# Patient Record
Sex: FEMALE | Race: WHITE | NOT HISPANIC OR LATINO | Employment: UNEMPLOYED | URBAN - METROPOLITAN AREA
[De-identification: names, ages, dates, MRNs, and addresses within clinical notes are randomized per-mention and may not be internally consistent; named-entity substitution may affect disease eponyms.]

---

## 2017-11-04 ENCOUNTER — OFFICE VISIT - HEALTHEAST (OUTPATIENT)
Dept: FAMILY MEDICINE | Facility: CLINIC | Age: 22
End: 2017-11-04

## 2017-11-04 DIAGNOSIS — R05.9 COUGH: ICD-10-CM

## 2017-11-04 DIAGNOSIS — J45.901 EXACERBATION OF PERSISTENT ASTHMA: ICD-10-CM

## 2017-11-04 RX ORDER — ALBUTEROL SULFATE 0.83 MG/ML
2.5 SOLUTION RESPIRATORY (INHALATION) EVERY 4 HOURS PRN
Qty: 75 ML | Refills: 0 | Status: SHIPPED | OUTPATIENT
Start: 2017-11-04 | End: 2022-08-26

## 2019-08-08 ENCOUNTER — OFFICE VISIT - HEALTHEAST (OUTPATIENT)
Dept: FAMILY MEDICINE | Facility: CLINIC | Age: 24
End: 2019-08-08

## 2019-08-08 DIAGNOSIS — G47.9 SLEEP DISORDER: ICD-10-CM

## 2019-08-08 DIAGNOSIS — K90.0 CELIAC DISEASE: ICD-10-CM

## 2019-08-08 DIAGNOSIS — F41.8 DEPRESSION WITH ANXIETY: ICD-10-CM

## 2020-01-15 ENCOUNTER — COMMUNICATION - HEALTHEAST (OUTPATIENT)
Dept: FAMILY MEDICINE | Facility: CLINIC | Age: 25
End: 2020-01-15

## 2020-01-15 DIAGNOSIS — L70.9 ACNE, UNSPECIFIED ACNE TYPE: ICD-10-CM

## 2020-01-17 RX ORDER — BENZOYL PEROXIDE 2.5 G/100G
GEL TOPICAL
Qty: 42.5 G | Refills: 3 | Status: SHIPPED | OUTPATIENT
Start: 2020-01-17 | End: 2022-08-26

## 2020-01-21 ENCOUNTER — COMMUNICATION - HEALTHEAST (OUTPATIENT)
Dept: FAMILY MEDICINE | Facility: CLINIC | Age: 25
End: 2020-01-21

## 2020-01-21 DIAGNOSIS — J45.20 MILD INTERMITTENT ASTHMA, UNSPECIFIED WHETHER COMPLICATED: ICD-10-CM

## 2020-01-21 RX ORDER — ALBUTEROL SULFATE 90 UG/1
2 AEROSOL, METERED RESPIRATORY (INHALATION) EVERY 4 HOURS PRN
Qty: 1 INHALER | Refills: 0 | Status: SHIPPED | OUTPATIENT
Start: 2020-01-21

## 2020-03-15 ENCOUNTER — COMMUNICATION - HEALTHEAST (OUTPATIENT)
Dept: FAMILY MEDICINE | Facility: CLINIC | Age: 25
End: 2020-03-15

## 2020-03-15 DIAGNOSIS — F41.8 DEPRESSION WITH ANXIETY: ICD-10-CM

## 2020-03-15 DIAGNOSIS — G47.9 SLEEP DISORDER: ICD-10-CM

## 2020-05-04 ENCOUNTER — COMMUNICATION - HEALTHEAST (OUTPATIENT)
Dept: FAMILY MEDICINE | Facility: CLINIC | Age: 25
End: 2020-05-04

## 2020-06-08 ENCOUNTER — COMMUNICATION - HEALTHEAST (OUTPATIENT)
Dept: FAMILY MEDICINE | Facility: CLINIC | Age: 25
End: 2020-06-08

## 2020-06-08 DIAGNOSIS — G47.9 SLEEP DISORDER: ICD-10-CM

## 2020-07-02 ENCOUNTER — RECORDS - HEALTHEAST (OUTPATIENT)
Dept: ADMINISTRATIVE | Facility: OTHER | Age: 25
End: 2020-07-02

## 2020-07-02 LAB
CHLAMYDIA_EXT- HISTORICAL: NEGATIVE
SPECIMEN DESCRIPTION_EXT (HISTORICAL CONVERSION): NORMAL

## 2020-08-21 ENCOUNTER — COMMUNICATION - HEALTHEAST (OUTPATIENT)
Dept: FAMILY MEDICINE | Facility: CLINIC | Age: 25
End: 2020-08-21

## 2020-08-26 ENCOUNTER — OFFICE VISIT - HEALTHEAST (OUTPATIENT)
Dept: FAMILY MEDICINE | Facility: CLINIC | Age: 25
End: 2020-08-26

## 2020-08-26 DIAGNOSIS — L70.9 ACNE, UNSPECIFIED ACNE TYPE: ICD-10-CM

## 2020-08-26 DIAGNOSIS — G47.9 SLEEP DISORDER: ICD-10-CM

## 2020-08-26 DIAGNOSIS — F41.8 DEPRESSION WITH ANXIETY: ICD-10-CM

## 2020-08-26 RX ORDER — ADAPALENE GEL USP, 0.3% 3 MG/G
GEL TOPICAL
Qty: 45 G | Refills: 3 | Status: SHIPPED | OUTPATIENT
Start: 2020-08-26 | End: 2021-12-08

## 2020-08-26 ASSESSMENT — PATIENT HEALTH QUESTIONNAIRE - PHQ9: SUM OF ALL RESPONSES TO PHQ QUESTIONS 1-9: 17

## 2020-08-26 ASSESSMENT — MIFFLIN-ST. JEOR: SCORE: 1450.11

## 2020-09-22 ENCOUNTER — COMMUNICATION - HEALTHEAST (OUTPATIENT)
Dept: FAMILY MEDICINE | Facility: CLINIC | Age: 25
End: 2020-09-22

## 2020-09-22 ENCOUNTER — COMMUNICATION - HEALTHEAST (OUTPATIENT)
Dept: SCHEDULING | Facility: CLINIC | Age: 25
End: 2020-09-22

## 2020-09-22 DIAGNOSIS — F41.8 DEPRESSION WITH ANXIETY: ICD-10-CM

## 2020-10-18 ENCOUNTER — COMMUNICATION - HEALTHEAST (OUTPATIENT)
Dept: FAMILY MEDICINE | Facility: CLINIC | Age: 25
End: 2020-10-18

## 2020-10-19 ENCOUNTER — COMMUNICATION - HEALTHEAST (OUTPATIENT)
Dept: FAMILY MEDICINE | Facility: CLINIC | Age: 25
End: 2020-10-19

## 2020-10-29 ENCOUNTER — COMMUNICATION - HEALTHEAST (OUTPATIENT)
Dept: FAMILY MEDICINE | Facility: CLINIC | Age: 25
End: 2020-10-29

## 2020-10-29 ENCOUNTER — OFFICE VISIT - HEALTHEAST (OUTPATIENT)
Dept: FAMILY MEDICINE | Facility: CLINIC | Age: 25
End: 2020-10-29

## 2020-10-29 DIAGNOSIS — R10.10 UPPER ABDOMINAL PAIN: ICD-10-CM

## 2020-11-03 ENCOUNTER — COMMUNICATION - HEALTHEAST (OUTPATIENT)
Dept: FAMILY MEDICINE | Facility: CLINIC | Age: 25
End: 2020-11-03

## 2020-11-03 DIAGNOSIS — R10.10 UPPER ABDOMINAL PAIN: ICD-10-CM

## 2020-11-19 ENCOUNTER — TRANSFERRED RECORDS (OUTPATIENT)
Dept: MULTI SPECIALTY CLINIC | Facility: CLINIC | Age: 25
End: 2020-11-19

## 2020-11-19 LAB — PAP SMEAR - HIM PATIENT REPORTED: NEGATIVE

## 2020-11-20 ENCOUNTER — VIRTUAL VISIT (OUTPATIENT)
Dept: FAMILY MEDICINE | Facility: OTHER | Age: 25
End: 2020-11-20

## 2020-11-20 ENCOUNTER — RECORDS - HEALTHEAST (OUTPATIENT)
Dept: HEALTH INFORMATION MANAGEMENT | Facility: CLINIC | Age: 25
End: 2020-11-20

## 2020-11-20 NOTE — PROGRESS NOTES
"Date: 2020 16:19:07  Clinician: Guilherme Nichols  Clinician NPI: [id number]  Patient: Genevieve Ryan  Patient : 1995  Patient Address: [address] [place] [address]  Patient Phone: [phone]  Visit Protocol: URI  Patient Summary:  Genevieve is a 25 year old ( : 1995 ) female who initiated a [facility] Visit for COVID-19 (Coronavirus) evaluation and screening. When asked the question \"Please sign me up to receive news, health information and promotions from [facility].\", Genevieve responded \"No\".    Genevieve states her symptoms started today.   Her symptoms consist of rhinitis, myalgia, chills, malaise, a sore throat, a headache, and a cough. Genevieve also feels feverish but was unable to measure her temperature.   Symptom details     Nasal secretions: The color of her mucus is clear.    Cough: Genevieve coughs a few times an hour and her cough is more bothersome at night. Phlegm comes into her throat when she coughs. She believes her cough is caused by post-nasal drip. The color of the phlegm is white and clear.     Sore throat: Genevieve reports having moderate throat pain (4-6 on a 10 point pain scale), does not have exudate on her tonsils, and can swallow liquids. She is not sure if the lymph nodes in her neck are enlarged. A rash has not appeared on the skin since the sore throat started.     Headache: She states the headache is moderate (4-6 on a 10 point pain scale).      Genevieve denies having vomiting, facial pain or pressure, teeth pain, ageusia, diarrhea, ear pain, wheezing, nasal congestion, nausea, and anosmia. She also denies taking antibiotic medication in the past month, having recent facial or sinus surgery in the past 60 days, and having a sinus infection within the past year. She is not experiencing dyspnea.   Precipitating events  Within the past week, Genevieve has not been exposed to someone with strep throat. She has not recently been exposed to someone with influenza. " Genevieve has been in close contact with the following high risk individuals: adults 65 or older and people with asthma, heart disease or diabetes.   Pertinent COVID-19 (Coronavirus) information  Genevieve does not work or volunteer as healthcare worker or a . In the past 14 days, Genevieve has not worked or volunteered at a healthcare facility or group living setting.   In the past 14 days, she also has not lived in a congregate living setting.   Genevieve has had a close contact with a laboratory-confirmed COVID-19 patient within 14 days of symptom onset. She was not exposed at her work. Date Genevieve was exposed to the laboratory-confirmed COVID-19 patient: 11/15/2020   Additional information about contact with COVID-19 (Coronavirus) patient as reported by the patient (free text): My mother works in an ER. I live with her. She is confirmed covid positive.    Since December 2019, Genevieve has been tested for COVID-19 and has had upper respiratory infection (URI) or influenza-like illness.      Result of COVID-19 test: Negative     Date of her COVID-19 test: 08/01/2020     Date(s) of previous URI or influenza-like illness (free-text): Early august     Symptoms Genevieve experienced during previous URI or influenza-like illness as reported by the patient (free-text): Headache, fever, sore throat        Pertinent medical history  Genevieve does not get yeast infections when she takes antibiotics.   Genevieve does not need a return to work/school note.   Weight: 160 lbs   Genevieve does not smoke or use smokeless tobacco.   She denies pregnancy and denies breastfeeding. She has menstruated in the past month.   Additional information as reported by the patient (free text): I have celiac disease and athsma   Weight: 160 lbs    MEDICATIONS: CLAUDY (28) oral, Prozac oral, trazodone oral, ALLERGIES: Singulair, Augmentin  Clinician Response:  Dear Genevieve,   Your symptoms show that you may have coronavirus (COVID-19).  "This illness can cause fever, cough and trouble breathing. Many people get a mild case and get better on their own. Some people can get very sick.  What should I do?  We would like to test you for this virus.   1. Please call [phone] to schedule your visit. Explain that you were referred by [facility] to have a COVID-19 test. Be ready to [facility].  * If you need to schedule in [facility] please call [phone] or for [employer] employees please call [phone].  * If you need to schedule in the [place] area please call [phone]. [employer] employees call [phone].  The following will serve as your written order for this COVID Test, ordered by me, for the indication of suspected COVID [Z20.828]: The test will be ordered in nfon, our electronic health record, after you are scheduled. It will show as ordered and authorized by Rodolfo Gasca MD.  Order: COVID-19 (Coronavirus) PCR for SYMPTOMATIC testing from [facility].   2. When it's time for your COVID test:  Stay at least 6 feet away from others. (If someone will drive you to your test, stay in the backseat, as far away from the  as you can.)   Cover your mouth and nose with a mask, tissue or washcloth.  Go straight to the testing site. Don't make any stops on the way there or back.      3.Starting now: Stay home and away from others (self-isolate) until:   You've had no fever---and no medicine that reduces fever---for one full day (24 hours). And...   Your other symptoms have gotten better. For example, your cough or breathing has improved. And...   At least 10 days have passed since your symptoms started.       During this time, don't leave the house except for testing or medical care.   Stay in your own room, even for meals. Use your own bathroom if you can.   Stay away from others in your home. No hugging, kissing or shaking hands. No visitors.  Don't go to work, school or anywhere else.    Clean \"high touch\" surfaces often " (doorknobs, counters, handles, etc.). Use a household cleaning spray or wipes. You'll find a full list of  on the EPA website: www.epa.gov/pesticide-registration/list-n-disinfectants-use-against-sars-cov-2.   Cover your mouth and nose with a mask, tissue or washcloth to avoid spreading germs.  Wash your hands and face often. Use soap and water.  Caregivers in these groups are at risk for severe illness due to COVID-19:  o People 65 years and older  o People who live in a nursing home or long-term care facility  o People with chronic disease (lung, heart, cancer, diabetes, kidney, liver, immunologic)  o People who have a weakened immune system, including those who:   Are in cancer treatment  Take medicine that weakens the immune system, such as corticosteroids  Had a bone marrow or organ transplant  Have an immune deficiency  Have poorly controlled HIV or AIDS  Are obese (body mass index of 40 or higher)  Smoke regularly   o Caregivers should wear gloves while washing dishes, handling laundry and cleaning bedrooms and bathrooms.  o Use caution when washing and drying laundry: Don't shake dirty laundry, and use the warmest water setting that you can.  o For more tips, go to www.cdc.gov/coronavirus/2019-ncov/downloads/10Things.pdf.    How can I take care of myself?    Get lots of rest. Drink extra fluids (unless a doctor has told you not to).   Take Tylenol (acetaminophen) for fever or pain. If you have liver or kidney problems, ask your family doctor if it's okay to take Tylenol.   Adults can take either:    650 mg (two 325 mg pills) every 4 to 6 hours, or...   1,000 mg (two 500 mg pills) every 8 hours as needed.    Note: Don't take more than 3,000 mg in one day. Acetaminophen is found in many medicines (both prescribed and over-the-counter medicines). Read all labels to be sure you don't take too much.   For children, check the Tylenol bottle for the right dose. The dose is based on the child's age or weight.     If you have other health problems (like cancer, heart failure, an organ transplant or severe kidney disease): Call your specialty clinic if you don't feel better in the next 2 days.       Know when to call 911. Emergency warning signs include:    Trouble breathing or shortness of breath Pain or pressure in the chest that doesn't go away Feeling confused like you haven't felt before, or not being able to wake up Bluish-colored lips or face.  Where can I get more information?   [facility] -- About COVID-19: [email or web address]   CDC -- What to Do If You're Sick: www.cdc.gov/coronavirus/2019-ncov/about/steps-when-sick.html   CDC -- Ending Home Isolation: www.cdc.gov/coronavirus/2019-ncov/hcp/disposition-in-home-patients.html   [facility] -- Caring for Someone: www.cdc.gov/coronavirus/2019-ncov/if-you-are-sick/care-for-someone.html   [facility] -- Interim Guidance for Hospital Discharge to Home: www.[facility].[place]./diseases/coronavirus/hcp/hospdischarge.pdf   [facility] clinical trials (COVID-19 research studies): clinicalaffairs.[facility]-clinical-trials    Below are the COVID-19 hotlines at the [facility] ([facility]). Interpreters are available.    For health questions: Call 959-859-7177 or 1-647.467.1912 (7 a.m. to 7 p.m.) For questions about schools and childcare: Call 530-859-9664 or 1-671.176.6334 (7 a.m. to 7 p.m.)    Diagnosis: Contact with and (suspected) exposure to other viral communicable diseases  Diagnosis ICD: Z20.828

## 2021-03-01 ENCOUNTER — COMMUNICATION - HEALTHEAST (OUTPATIENT)
Dept: FAMILY MEDICINE | Facility: CLINIC | Age: 26
End: 2021-03-01

## 2021-03-01 DIAGNOSIS — L70.9 ACNE, UNSPECIFIED ACNE TYPE: ICD-10-CM

## 2021-03-01 RX ORDER — CLINDAMYCIN PHOSPHATE 11.9 MG/ML
1 SOLUTION TOPICAL 2 TIMES DAILY
Qty: 30 ML | Refills: 3 | Status: SHIPPED | OUTPATIENT
Start: 2021-03-01 | End: 2022-08-26

## 2021-03-04 ENCOUNTER — COMMUNICATION - HEALTHEAST (OUTPATIENT)
Dept: INTERNAL MEDICINE | Facility: CLINIC | Age: 26
End: 2021-03-04

## 2021-03-04 DIAGNOSIS — G47.9 SLEEP DISORDER: ICD-10-CM

## 2021-03-05 RX ORDER — TRAZODONE HYDROCHLORIDE 50 MG/1
50 TABLET, FILM COATED ORAL AT BEDTIME
Qty: 90 TABLET | Refills: 1 | Status: SHIPPED | OUTPATIENT
Start: 2021-03-05

## 2021-03-24 ENCOUNTER — COMMUNICATION - HEALTHEAST (OUTPATIENT)
Dept: FAMILY MEDICINE | Facility: CLINIC | Age: 26
End: 2021-03-24

## 2021-03-24 DIAGNOSIS — F41.8 DEPRESSION WITH ANXIETY: ICD-10-CM

## 2021-03-25 ENCOUNTER — COMMUNICATION - HEALTHEAST (OUTPATIENT)
Dept: FAMILY MEDICINE | Facility: CLINIC | Age: 26
End: 2021-03-25

## 2021-03-25 DIAGNOSIS — F41.8 DEPRESSION WITH ANXIETY: ICD-10-CM

## 2021-03-31 ENCOUNTER — OFFICE VISIT - HEALTHEAST (OUTPATIENT)
Dept: FAMILY MEDICINE | Facility: CLINIC | Age: 26
End: 2021-03-31

## 2021-03-31 DIAGNOSIS — F41.8 DEPRESSION WITH ANXIETY: ICD-10-CM

## 2021-03-31 RX ORDER — FLUOXETINE 40 MG/1
40 CAPSULE ORAL DAILY
Qty: 90 CAPSULE | Refills: 3 | Status: SHIPPED | OUTPATIENT
Start: 2021-03-31 | End: 2022-06-08

## 2021-03-31 ASSESSMENT — ANXIETY QUESTIONNAIRES
3. WORRYING TOO MUCH ABOUT DIFFERENT THINGS: NOT AT ALL
4. TROUBLE RELAXING: NOT AT ALL
6. BECOMING EASILY ANNOYED OR IRRITABLE: NOT AT ALL
5. BEING SO RESTLESS THAT IT IS HARD TO SIT STILL: NOT AT ALL
7. FEELING AFRAID AS IF SOMETHING AWFUL MIGHT HAPPEN: NOT AT ALL
2. NOT BEING ABLE TO STOP OR CONTROL WORRYING: NOT AT ALL
1. FEELING NERVOUS, ANXIOUS, OR ON EDGE: NOT AT ALL
GAD7 TOTAL SCORE: 0

## 2021-03-31 ASSESSMENT — PATIENT HEALTH QUESTIONNAIRE - PHQ9: SUM OF ALL RESPONSES TO PHQ QUESTIONS 1-9: 0

## 2021-05-17 ENCOUNTER — OFFICE VISIT - HEALTHEAST (OUTPATIENT)
Dept: FAMILY MEDICINE | Facility: CLINIC | Age: 26
End: 2021-05-17

## 2021-05-17 DIAGNOSIS — Z00.00 ANNUAL PHYSICAL EXAM: ICD-10-CM

## 2021-05-17 DIAGNOSIS — J98.2 PNEUMOMEDIASTINUM (H): ICD-10-CM

## 2021-05-17 DIAGNOSIS — Z13.1 SCREENING FOR DIABETES MELLITUS: ICD-10-CM

## 2021-05-17 DIAGNOSIS — F33.42 RECURRENT MAJOR DEPRESSIVE DISORDER, IN FULL REMISSION (H): ICD-10-CM

## 2021-05-17 DIAGNOSIS — F51.5 NIGHTMARES: ICD-10-CM

## 2021-05-17 DIAGNOSIS — Z13.220 SCREENING CHOLESTEROL LEVEL: ICD-10-CM

## 2021-05-17 DIAGNOSIS — Z11.59 SCREENING FOR VIRAL DISEASE: ICD-10-CM

## 2021-05-17 RX ORDER — PRAZOSIN HYDROCHLORIDE 1 MG/1
2 CAPSULE ORAL AT BEDTIME
Qty: 60 CAPSULE | Refills: 1 | Status: SHIPPED | OUTPATIENT
Start: 2021-05-17 | End: 2022-08-26

## 2021-05-17 ASSESSMENT — ANXIETY QUESTIONNAIRES
7. FEELING AFRAID AS IF SOMETHING AWFUL MIGHT HAPPEN: NOT AT ALL
6. BECOMING EASILY ANNOYED OR IRRITABLE: NOT AT ALL
5. BEING SO RESTLESS THAT IT IS HARD TO SIT STILL: NOT AT ALL
4. TROUBLE RELAXING: NOT AT ALL
IF YOU CHECKED OFF ANY PROBLEMS ON THIS QUESTIONNAIRE, HOW DIFFICULT HAVE THESE PROBLEMS MADE IT FOR YOU TO DO YOUR WORK, TAKE CARE OF THINGS AT HOME, OR GET ALONG WITH OTHER PEOPLE: NOT DIFFICULT AT ALL
1. FEELING NERVOUS, ANXIOUS, OR ON EDGE: SEVERAL DAYS
GAD7 TOTAL SCORE: 1
2. NOT BEING ABLE TO STOP OR CONTROL WORRYING: NOT AT ALL
3. WORRYING TOO MUCH ABOUT DIFFERENT THINGS: NOT AT ALL

## 2021-05-17 ASSESSMENT — MIFFLIN-ST. JEOR: SCORE: 1430.15

## 2021-05-17 ASSESSMENT — PATIENT HEALTH QUESTIONNAIRE - PHQ9: SUM OF ALL RESPONSES TO PHQ QUESTIONS 1-9: 6

## 2021-05-27 VITALS
DIASTOLIC BLOOD PRESSURE: 80 MMHG | HEIGHT: 63 IN | WEIGHT: 157.4 LBS | HEART RATE: 74 BPM | BODY MASS INDEX: 27.89 KG/M2 | SYSTOLIC BLOOD PRESSURE: 122 MMHG | OXYGEN SATURATION: 97 %

## 2021-05-27 ASSESSMENT — PATIENT HEALTH QUESTIONNAIRE - PHQ9
SUM OF ALL RESPONSES TO PHQ QUESTIONS 1-9: 17
SUM OF ALL RESPONSES TO PHQ QUESTIONS 1-9: 0
SUM OF ALL RESPONSES TO PHQ QUESTIONS 1-9: 6

## 2021-05-28 ASSESSMENT — ASTHMA QUESTIONNAIRES
ACT_TOTALSCORE: 25
ACT_TOTALSCORE: 25

## 2021-05-28 ASSESSMENT — ANXIETY QUESTIONNAIRES
GAD7 TOTAL SCORE: 0
GAD7 TOTAL SCORE: 1

## 2021-05-30 ENCOUNTER — RECORDS - HEALTHEAST (OUTPATIENT)
Dept: ADMINISTRATIVE | Facility: CLINIC | Age: 26
End: 2021-05-30

## 2021-05-31 VITALS — BODY MASS INDEX: 26.89 KG/M2 | WEIGHT: 149.4 LBS

## 2021-05-31 NOTE — PROGRESS NOTES
Assessment/Plan:        1. Depression with anxiety  Doing well on the current plan    Refill:  - FLUoxetine (PROZAC) 20 MG capsule; Take 1 capsule (20 mg total) by mouth daily.  Dispense: 90 capsule; Refill: 1    2. Sleep disorder  Doing well  Continue with  - traZODone (DESYREL) 50 MG tablet; Take 1 tablet (50 mg total) by mouth at bedtime.  Dispense: 90 tablet; Refill: 1    3. Celiac disease    At the conclusion of the encounter the plan of care, disposition and all questions were answered and reviewed, and the patient acknowledged understanding and was involved in the decision making regarding the overall care plan.         Subjective:    Patient ID:   Genevieve Ryan is a 24 y.o. female with a history of depression/anxiety and celiac disease here to establish care, needing a refill on her antidepressants.  She reports to be doing well on her current management with Prozac and trazodone having no issues or concerns or intolerance.  She has felt much better since going gluten-free diet.  She is also wondering if she could however acne medications refilled by her primary care office.  She has no other concerns.      Review of Systems  Allergy: reviewed  A complete 7 point review of systems was obtained and is negative other than what is stated in the HPI.         The following patient's history were reviewed and updated as appropriate:   She  has no past medical history on file.  She  has a past surgical history that includes pr removal adenoids,primary,<13 y/o and pr nasal/maxill sinus endoscopy,dx.  Her family history is not on file.  She  reports that she has never smoked. She has never used smokeless tobacco. Her alcohol and drug histories are not on file..      Outpatient Encounter Medications as of 8/8/2019   Medication Sig Dispense Refill     adapalene (DIFFERIN) 0.3 % gel Apply topically.       benzoyl peroxide 2.5 % topical gel Apply topically.       clindamycin (CLEOCIN T) 1 % external solution Apply  1 application topically.       FLUoxetine (PROZAC) 20 MG capsule   1     traZODone (DESYREL) 50 MG tablet Take 0.5 tablets (25 mg total) by mouth bedtime. 15 tablet 6     [DISCONTINUED] traZODone (DESYREL) 50 MG tablet Take 50 mg by mouth.       albuterol (PROVENTIL HFA;VENTOLIN HFA) 90 mcg/actuation inhaler Inhale 2 puffs every 4 (four) hours as needed.        albuterol (PROVENTIL) 2.5 mg /3 mL (0.083 %) nebulizer solution Take 3 mL (2.5 mg total) by nebulization every 4 (four) hours as needed. 75 mL 0     DIPHENHYDRAMINE HCL ORAL Take by mouth.       drospirenone-ethinyl estradiol (VESTURA, 28,) 3-0.02 mg per tablet TAKE 1 TABLET BY MOUTH EVERY DAY 28 tablet 12     fexofenadine (ALLEGRA) 180 MG tablet Take 180 mg by mouth daily. As needed for allergies       ipratropium (ATROVENT) 0.02 % nebulizer solution Take 500 mcg by nebulization as needed.        ipratropium-albuterol (DUONEB) 0.5-2.5 mg/mL nebulizer 1 vial in nebulizer every 5-8 hours as needed       LACTOBACILLUS RHAMNOSUS GG (CULTURELLE ORAL) Take daily as directed       melatonin 5 mg Tab Take 1 tablet by mouth bedtime.       Facility-Administered Encounter Medications as of 8/8/2019   Medication Dose Route Frequency Provider Last Rate Last Dose     cefTRIAXone injection 1 g (ROCEPHIN)  1 g Intramuscular Once Nithya Egan MD             Objective:   /70 (Patient Site: Right Arm, Patient Position: Sitting, Cuff Size: Adult Regular)   Pulse 81   Wt 154 lb 6.4 oz (70 kg)   SpO2 98%       Physical Exam  General Appearance:    Alert, well hydrated, no distress   Throat:   mucous membranes moist, pharynx normal without lesions   Neck:   Supple, symmetrical, trachea midline, no adenopathy;     thyroid:  no enlargement/tenderness/nodules;    Lungs:     clear to auscultation, no wheezes, rales or rhonchi, symmetric air entry     Heart:    Regular rate and rhythm, S1 and S2 normal, no murmur, rub   or gallop, no edema    Skin:   Skin  color, texture, turgor normal, no rashes or lesions

## 2021-06-03 VITALS — WEIGHT: 154.4 LBS | BODY MASS INDEX: 27.79 KG/M2

## 2021-06-04 VITALS
OXYGEN SATURATION: 99 % | HEIGHT: 63 IN | WEIGHT: 161.8 LBS | HEART RATE: 78 BPM | DIASTOLIC BLOOD PRESSURE: 76 MMHG | BODY MASS INDEX: 28.67 KG/M2 | SYSTOLIC BLOOD PRESSURE: 122 MMHG

## 2021-06-05 VITALS
DIASTOLIC BLOOD PRESSURE: 76 MMHG | WEIGHT: 163 LBS | BODY MASS INDEX: 29.34 KG/M2 | TEMPERATURE: 98.1 F | SYSTOLIC BLOOD PRESSURE: 108 MMHG | HEART RATE: 77 BPM | OXYGEN SATURATION: 98 %

## 2021-06-05 NOTE — TELEPHONE ENCOUNTER
Last Office Visit  8/8/2019 Jovi Ochoa MD  Notes:  1. Depression with anxiety  Doing well on the current plan     Refill:  - FLUoxetine (PROZAC) 20 MG capsule; Take 1 capsule (20 mg total) by mouth daily.  Dispense: 90 capsule; Refill: 1     2. Sleep disorder  Doing well  Continue with  - traZODone (DESYREL) 50 MG tablet; Take 1 tablet (50 mg total) by mouth at bedtime.  Dispense: 90 tablet; Refill: 1     3. Celiac disease     At the conclusion of the encounter the plan of care, disposition and all questions were answered and reviewed, and the patient acknowledged understanding and was involved in the decision making regarding the overall care plan.              Last Filled:  albuterol (PROVENTIL HFA;VENTOLIN HFA) 90 mcg/actuation inhaler     --   Sig - Route: Inhale 2 puffs every 4 (four) hours as needed.  - Inhalation   Class: Historical Med       Next OV:  Visit date not found        Medication teed up for provider signature

## 2021-06-05 NOTE — TELEPHONE ENCOUNTER
Medication Request  Medication name:     1)Clindamycin 1% Topical Solution, 60 ml, apply to affected area every morning  2) Benzoyl Peroxide 2.5% Gel, 60 gm, apply to affected area every morning      Requested Pharmacy: [facility] in [place], [place]  Reason for request: Current medication  When did you use medication last?:  unknown   Patient offered appointment:  N/A - electronic request  Okay to leave a detailed message: no

## 2021-06-05 NOTE — TELEPHONE ENCOUNTER
Called to pt and pt states that she is still using medication for outbreaks - only uses few times per month  - still receiving good results.

## 2021-06-05 NOTE — TELEPHONE ENCOUNTER
Last Office Visit  8/8/2019 Jovi Ochoa MD  Notes:    Genevieve Ryan is a 24 y.o. female with a history of depression/anxiety and celiac disease here to establish care, needing a refill on her antidepressants.  She reports to be doing well on her current management with Prozac and trazodone having no issues or concerns or intolerance.  She has felt much better since going gluten-free diet.  She is also wondering if she could however acne medications refilled by her primary care office.  She has no other concerns.     Last Filled:    clindamycin (CLEOCIN T) 1 % external solution     --   Sig - Route: Apply 1 application topically. - Topical   Class: Historical Med     benzoyl peroxide 2.5 % topical gel     --   Sig - Route: Apply topically. - Topical   Class: Historical Med        Next OV:  Visit date not found -   Return in about 6 months (around 2/8/2020) for Follow up in Primary Care          Medication teed up for provider signature

## 2021-06-06 NOTE — TELEPHONE ENCOUNTER
Last Office Visit  8/8/2019 Jovi Ochoa MD  Notes:  1. Depression with anxiety  Doing well on the current plan     Refill:  - FLUoxetine (PROZAC) 20 MG capsule; Take 1 capsule (20 mg total) by mouth daily.  Dispense: 90 capsule; Refill: 1     2. Sleep disorder  Doing well  Continue with  - traZODone (DESYREL) 50 MG tablet; Take 1 tablet (50 mg total) by mouth at bedtime.  Dispense: 90 tablet; Refill: 1       Last Filled:    traZODone (DESYREL) 50 MG tablet  90 tablet  1  8/8/2019   No    Sig - Route: Take 1 tablet (50 mg total) by mouth at bedtime. - Oral    Sent to pharmacy as: traZODone (DESYREL) 50 MG tablet    E-Prescribing Status: Receipt confirmed by pharmacy (8/8/2019  9:35 AM CDT)          Next OV:  Visit date not found -nothing scheduled      Medication teed up for provider signature

## 2021-06-08 NOTE — TELEPHONE ENCOUNTER
Refill Request: Trazadone  Last filled: 3.17.2020 disp 90 no refills   last visit: 8.8.2019  Sleep disorder  Doing well  Continue with  - traZODone (DESYREL) 50 MG tablet; Take 1 tablet (50 mg total) by mouth at bedtime.  Dispense: 90 tablet; Refill: 1

## 2021-06-10 NOTE — PROGRESS NOTES
Assessment/Plan:        1. Sleep disorder  Refill  - traZODone (DESYREL) 50 MG tablet; Take 1 tablet (50 mg total) by mouth at bedtime.  Dispense: 90 tablet; Refill: 1    2. Depression with anxiety  Discussed change of dosage to 40 mg    - FLUoxetine (PROZAC) 40 MG capsule; Take 1 capsule (40 mg total) by mouth daily.  Dispense: 30 capsule; Refill: 0  Follow-up in a month to recheck      3. Acne, unspecified acne type  Refill  - adapalene (DIFFERIN) 0.3 % gel; Apply to the affected area daily  Dispense: 45 g; Refill: 3        At the conclusion of the encounter the plan of care, disposition and all questions were answered and reviewed, and the patient acknowledged understanding and was involved in the decision making regarding the overall care plan.     Patient Care Team:  Jovi Ochoa MD as PCP - General (Family Medicine)  Jovi Ochoa MD as Assigned PCP          Subjective:    Patient ID:   Genevieve Ryan is a 25 y.o. female here for the follow-up of the followin. Depression with anxiety  On fluoxetine 20 mg daily   seems to be worsening over the past 2 weeks not being able to cope and according with of the higher dose of the medication can be more helpful       2. Sleep disorder  Has been doing well on trazodone for the most part but states increased anxiety recently has not been able to and has had a couple of nightmares in the past 2 weeks.  Thinking that his anxiety is under better control than will also help to improve her sleep situation.    3- facial acne  Has used Differin gel in the past but not used for several years;  would like to get a refill to start using  She is not actively trying to conceive or planning to get pregnant.    Review of Systems  Allergy: reviewed  General : negative  A complete 5 point review of systems was obtained and is negative other than what is stated in the HPI.       The following patient's history were reviewed and updated as appropriate:    "She  has no past medical history on file..      Outpatient Encounter Medications as of 8/26/2020   Medication Sig Dispense Refill     albuterol (PROAIR HFA;PROVENTIL HFA;VENTOLIN HFA) 90 mcg/actuation inhaler Inhale 2 puffs every 4 (four) hours as needed. 1 Inhaler 0     benzoyl peroxide 2.5 % topical gel Apply to the skin two times a day 42.5 g 3     clindamycin (CLEOCIN T) 1 % external solution Apply 1 application topically 2 (two) times a day. 30 mL 3     drospirenone-ethinyl estradiol (VESTURA, 28,) 3-0.02 mg per tablet TAKE 1 TABLET BY MOUTH EVERY DAY 28 tablet 12     FLUoxetine (PROZAC) 20 MG capsule Take 1 capsule (20 mg total) by mouth daily. 90 capsule 0     melatonin 5 mg Tab Take 1 tablet by mouth bedtime.       traZODone (DESYREL) 50 MG tablet Take 1 tablet (50 mg total) by mouth at bedtime. 90 tablet 0     adapalene (DIFFERIN) 0.3 % gel Apply topically.       albuterol (PROVENTIL) 2.5 mg /3 mL (0.083 %) nebulizer solution Take 3 mL (2.5 mg total) by nebulization every 4 (four) hours as needed. 75 mL 0     DIPHENHYDRAMINE HCL ORAL Take by mouth.       fexofenadine (ALLEGRA) 180 MG tablet Take 180 mg by mouth daily. As needed for allergies       ipratropium (ATROVENT) 0.02 % nebulizer solution Take 500 mcg by nebulization as needed.        ipratropium-albuterol (DUONEB) 0.5-2.5 mg/mL nebulizer 1 vial in nebulizer every 5-8 hours as needed       LACTOBACILLUS RHAMNOSUS GG (CULTURELLE ORAL) Take daily as directed       Facility-Administered Encounter Medications as of 8/26/2020   Medication Dose Route Frequency Provider Last Rate Last Dose     cefTRIAXone injection 1 g (ROCEPHIN)  1 g Intramuscular Once Nithya Egan MD             Objective:   /76 (Patient Site: Right Arm, Patient Position: Sitting, Cuff Size: Adult Regular)   Pulse 78   Ht 5' 2.5\" (1.588 m)   Wt 161 lb 12.8 oz (73.4 kg)   LMP 08/01/2020   SpO2 99%   BMI 29.12 kg/m        Physical Exam  General Appearance:    " Alert, cooperative, no distress,    Throat:   Lips, mucosa, and tongue normal; teeth and gums normal   Neck:   Supple, symmetrical, trachea midline, no adenopathy;     thyroid:  no enlargement/tenderness/nodules;    Lungs:     Clear to auscultation bilaterally, respirations unlabored    Heart:    Regular rate and rhythm, S1 and S2 normal, no murmur, rub   or gallop   Skin:   Skin color, texture, turgor normal, no rashes or lesions   Psych:     Mental status: Alert, oriented, thought content appropriate, affect: normal, thought content exhibits logical connections

## 2021-06-11 NOTE — TELEPHONE ENCOUNTER
Last Office Visit  9/22/2020 Joshua Kelly, DO    [facility]      08/26/2020 for med check    Notes:  2. Depression with anxiety  Discussed change of dosage to 40 mg     - FLUoxetine (PROZAC) 40 MG capsule; Take 1 capsule (40 mg total) by mouth daily.  Dispense: 30 capsule; Refill: 0         Last Filled:  08/26/2020     FLUoxetine (PROZAC) 40 MG capsule  30 capsule  0  8/26/2020   No    Sig - Route: Take 1 capsule (40 mg total) by mouth daily. - Oral        Next OV:  Visit date not found - pt states that the current dose of 40mg is working great for her        Medication teed up for provider signature

## 2021-06-12 NOTE — PROGRESS NOTES
"Genevieve Ryan is a 25 y.o. female who is being evaluated via a billable video visit.      The patient has been notified of following:     \"This video visit will be conducted via a call between you and your physician/provider. We have found that certain health care needs can be provided without the need for an in-person physical exam.  This service lets us provide the care you need with a video conversation.  If a prescription is necessary we can send it directly to your pharmacy.  If lab work is needed we can place an order for that and you can then stop by our lab to have the test done at a later time.    Video visits are billed at different rates depending on your insurance coverage. Please reach out to your insurance provider with any questions.    If during the course of the call the physician/provider feels a video visit is not appropriate, you will not be charged for this service.\"    Patient has given verbal consent to a Video visit? Yes  How would you like to obtain your AVS? AVS Preference: MyChart.  If dropped by the video visit, the video invitation should be sent to: Text to cell phone: [phone]  Will anyone else be joining your video visit? No        Video Start Time: 10:10 AM    Assessment/Plan:        1. Upper abdominal pain  GERD w/ esophagitis and hemorrhagic  Post ER follow-up    Plan:   advised trial of prilosec up to 80 mg daily with referral to upper GI endoscopy    - Ambulatory Referral for Upper GI Endoscopy   We will follow-up to the results of the study and manage accordingly.      She is advised to call back for follow-up closely with any worsening symptoms or change in disposition evaluation and management.          At the conclusion of the encounter the plan of care, disposition and all questions were answered and reviewed, and the patient acknowledged understanding and was involved in the decision making regarding the overall care plan.           Subjective:    Patient ID: "   Genevieve Ryan is a 25 y.o. female video getting in ER follow-up seen on 10/19/2020 for upper abdominal pain, diagnosed with GERD and hemorrhage and hemorrhage given omeprazole 20 mg take twice daily with a follow-up in primary care to discuss follow-up outpatient upper endoscopy.  She reports to having ongoing upper abdominal pain radiating up to her chest without coffee-ground emesis.      Review of Systems  Allergy: reviewed  General : negative  A complete 5 point review of systems was obtained and is negative other than what is stated in the HPI.       The following patient's history were reviewed and updated as appropriate:   She  has no past medical history on file..      Outpatient Encounter Medications as of 10/29/2020   Medication Sig Dispense Refill     adapalene (DIFFERIN) 0.3 % gel Apply to the affected area daily 45 g 3     benzoyl peroxide 2.5 % topical gel Apply to the skin two times a day 42.5 g 3     clindamycin (CLEOCIN T) 1 % external solution Apply 1 application topically 2 (two) times a day. 30 mL 3     drospirenone-ethinyl estradiol (VESTURA, 28,) 3-0.02 mg per tablet TAKE 1 TABLET BY MOUTH EVERY DAY 28 tablet 12     FLUoxetine (PROZAC) 40 MG capsule Take 1 capsule (40 mg total) by mouth daily. 90 capsule 1     melatonin 5 mg Tab Take 1 tablet by mouth bedtime.       omeprazole (PRILOSEC) 20 MG capsule Take 2 capsules (40 mg total) by mouth daily before breakfast. 30 capsule 0     prochlorperazine (COMPAZINE) 10 MG tablet Take 0.5 tablets (5 mg total) by mouth every 6 (six) hours as needed for nausea. 10 tablet 0     traZODone (DESYREL) 50 MG tablet Take 1 tablet (50 mg total) by mouth at bedtime. 90 tablet 1     albuterol (PROAIR HFA;PROVENTIL HFA;VENTOLIN HFA) 90 mcg/actuation inhaler Inhale 2 puffs every 4 (four) hours as needed. 1 Inhaler 0     albuterol (PROVENTIL) 2.5 mg /3 mL (0.083 %) nebulizer solution Take 3 mL (2.5 mg total) by nebulization every 4 (four) hours as needed. 75  mL 0     ipratropium (ATROVENT) 0.02 % nebulizer solution Take 500 mcg by nebulization as needed.        ipratropium-albuterol (DUONEB) 0.5-2.5 mg/mL nebulizer 1 vial in nebulizer every 5-8 hours as needed       Facility-Administered Encounter Medications as of 10/29/2020   Medication Dose Route Frequency Provider Last Rate Last Dose     cefTRIAXone injection 1 g (ROCEPHIN)  1 g Intramuscular Once Nithya Egan MD             Objective:   There were no vitals taken for this visit.      Physical Exam  GENERAL: Healthy, alert and no distress  NEURO: Cranial nerves grossly intact. Mentation and speech appropriate for age.  PSYCH: Mentation appears normal, affect normal/bright, judgement and insight intact, normal speech and appearance well-groomed         Video-Visit Details    Type of service:  Video Visit    Video End Time (time video stopped): 10:20 AM  Originating Location (pt. Location): Home    Distant Location (provider location):  [facility]     Platform used for Video Visit: Billy Ochoa MD

## 2021-06-12 NOTE — TELEPHONE ENCOUNTER
pls call patient with the following comment:   Given the recent normal studies ( EGD, ER labs/evaluations)  I would recommend a consultation by GI.    See epic for referral order

## 2021-06-13 NOTE — PROGRESS NOTES
Subjective:   Genevieve Ryan is a 22 y.o. female  No question data found.  Chief Complaint   Patient presents with     Cough     5 day antibiotic. On day 4. Everything else feels better except the cough. Nebbing was helping but ran out of sollution.   Says she was seen at [facility] on 10/31 and was told she tested negative for strep. She was not coughing, but had a sore throat. On  she went to [facility] and saw her primary. She was given a Z romie because of throat looked like strep, had a fever and swollen glands. Today is the 4th day of antibiotics. Yesterday she started coughing. Her mother is concerned about Mono. She has used her albuterol nebulized, but has not gotten relief for her coughing. Her albuterol solution just . In the last 48 hours has not had a fever. Admits slight CP from persistent coughing, but denies SOB.  Admits that she has not been sleeping well because of her coughing.  Says breathing feels tight. Last used albuterol at 10 am. Says in  she had a pneumomediastinum. Was diagnosed with Celiac about 14 months ago. Requesting prednisone and albuterol solution.  Review of Systems  Const - Resp - see HPI  Allergies   Allergen Reactions     Amoxicillin-Pot Clavulanate      Augmentin      Singulair [Montelukast]      Hives      Ondansetron Rash       Current Outpatient Prescriptions:      adapalene (DIFFERIN) 0.3 % gel, Apply topically., Disp: , Rfl:      albuterol (PROVENTIL) 2.5 mg /3 mL (0.083 %) nebulizer solution, 2.5 mg every 4 (four) hours as needed. , Disp: , Rfl:      azithromycin (ZITHROMAX) 500 MG tablet, Take 500 mg by mouth daily., Disp: , Rfl:      fexofenadine (ALLEGRA) 180 MG tablet, Take 180 mg by mouth daily. As needed for allergies, Disp: , Rfl:      ipratropium-albuterol (DUONEB) 0.5-2.5 mg/mL nebulizer, 1 vial in nebulizer every 5-8 hours as needed, Disp: , Rfl:      LACTOBACILLUS RHAMNOSUS GG (CULTURELLE ORAL), Take daily as directed, Disp:  , Rfl:      melatonin 5 mg Tab, Take 1 tablet by mouth bedtime., Disp: , Rfl:      traZODone (DESYREL) 50 MG tablet, Take 0.5 tablets (25 mg total) by mouth bedtime., Disp: 15 tablet, Rfl: 6     albuterol (PROVENTIL HFA;VENTOLIN HFA) 90 mcg/actuation inhaler, Inhale 2 puffs every 4 (four) hours as needed. , Disp: , Rfl:      benzoyl peroxide 2.5 % topical gel, Apply topically., Disp: , Rfl:      clindamycin (CLEOCIN T) 1 % external solution, Apply 1 application topically., Disp: , Rfl:      DIPHENHYDRAMINE HCL ORAL, Take by mouth., Disp: , Rfl:      drospirenone-ethinyl estradiol (VESTURA, 28,) 3-0.02 mg per tablet, TAKE 1 TABLET BY MOUTH EVERY DAY, Disp: 28 tablet, Rfl: 12     ipratropium (ATROVENT) 0.02 % nebulizer solution, Take 500 mcg by nebulization as needed. , Disp: , Rfl:     Current Facility-Administered Medications:      cefTRIAXone injection 1 g (ROCEPHIN), 1 g, Intramuscular, Once, Ntihya Arias MD  Patient Active Problem List   Diagnosis     Chronic Sinusitis     Foot Pain (Soft Tissue)     Headache     Dizziness     Allergies     Moderate Persistent Asthma     Constipation     Cellulitis     Medical History Reviewed  Objective:     Vitals:    11/04/17 1427   BP: 128/74   Patient Site: Right Arm   Patient Position: Sitting   Cuff Size: Adult Regular   Pulse: 85   Resp: 16   Temp: 98.4  F (36.9  C)   TempSrc: Oral   SpO2: 99%   Weight: 149 lb 6.4 oz (67.8 kg)   Gen - Pt in NAD  Eyes - Conjunctiva non injected, no drainage  Face - not TTP over frontal sinus areas; not TTP over maxillary area  Ears - external canals - no induration, Right TM - not injected, Left TM - not injected   Nose - not congested, no nasal drainage  Pharynx - non injected, tonsils 1+ size  Neck - supple, no cervical adenopathy, no masses  Cor - RRR w/o murmur  Lungs - Good air entry; no wheezes or crackles noted on auscultation - persistent tight dry coughing noted  Skin - no lesions, no rashes noted    Assessment  Name band; - Plan   Medical Decision Making -22-year-old woman with a history of moderate persistent asthma states she was treated for pharyngitis on  with a Z-Cornelius.  States that her sore throat resolved but she started coughing yesterday.  Says she has been coughing persistently since then but denies any shortness of breath.  On exam patient has good air movement but a persistent tight cough.  Discussed with patient and mother that she has already been treated for strep, pneumonia and pertussis, even though pertussis is was not likely from her presentation.  Agreed with patient that there is an indication for oral steroids, and since patient's albuterol solution was , ordered 1 carton of 25 vials.     1. Exacerbation of persistent asthma  - albuterol (PROVENTIL) 2.5 mg /3 mL (0.083 %) nebulizer solution; Take 3 mL (2.5 mg total) by nebulization every 4 (four) hours as needed.  Dispense: 75 mL; Refill: 0  - predniSONE (DELTASONE) 20 MG tablet; Take 2 tablets (40 mg total) by mouth daily for 5 days.  Dispense: 10 tablet; Refill: 0    2. Cough  - codeine-guaiFENesin (GUAIFENESIN AC)  mg/5 mL liquid; Take 5-10 mL by mouth every 6 (six) hours as needed.  Dispense: 180 mL; Refill: 0    At the conclusion of the encounter, assessment and plan were discussed.   All questions were answered.   The patient or guardian acknowledged understanding and was involved in the decision making regarding the overall care plan.    Patient Instructions   1. Follow up with your primary within a week  2. Stay well hydrated  2. May alternate tylenol every 6 hours with ibuprofen every 6 hours as needed for fever or pain  3. If symptoms are getting worse over time or you have any questions, call the clinic number - it's answered

## 2021-06-16 NOTE — PROGRESS NOTES
Assessment & Plan     Depression with anxiety  Doing well  Continue current plan  - FLUoxetine (PROZAC) 40 MG capsule; Take 1 capsule (40 mg total) by mouth daily.               Return in about 1 year (around 3/31/2022) for Recheck, or sooner with any issues or concerns.    Jovi Ochoa MD  [facility]   Genevieve Ryan is 25 y.o. and presents today for the following health issues   Here for med check and follow-up of depression/anxiety, on Prozac 40 mg dosage taking with no side effects or intolerance. She is requesting a refill and having no other concerns             Objective    /76   Pulse 77   Temp 98.1  F (36.7  C)   Wt 163 lb (73.9 kg)   SpO2 98%   BMI 29.34 kg/m    Body mass index is 29.34 kg/m .  Physical Exam  General Appearance:    Alert, well hydrated, no distress   Lungs:     clear to auscultation, no wheezes, rales or rhonchi, symmetric air entry     Heart:    Regular rate and rhythm, S1 and S2 normal, no murmur, rub   or gallop, no edema    Skin:   Skin color, texture, turgor normal, no rashes or lesions

## 2021-06-17 ENCOUNTER — COMMUNICATION - HEALTHEAST (OUTPATIENT)
Dept: FAMILY MEDICINE | Facility: CLINIC | Age: 26
End: 2021-06-17

## 2021-06-17 ENCOUNTER — COMMUNICATION - HEALTHEAST (OUTPATIENT)
Dept: INTERNAL MEDICINE | Facility: CLINIC | Age: 26
End: 2021-06-17

## 2021-06-17 DIAGNOSIS — L70.9 ACNE, UNSPECIFIED ACNE TYPE: ICD-10-CM

## 2021-06-17 NOTE — PROGRESS NOTES
Assessment:     1. Annual physical exam     2. Pneumomediastinum (H)     3. Recurrent major depressive disorder, in full remission (H)     4. Nightmares  prazosin (MINIPRESS) 1 MG capsule   5. Screening cholesterol level  Lipid [place] FASTING   6. Screening for viral disease  HIV Antigen/Antibody Diagnostic [place]    Hepatitis C Antibody (Anti-HCV)   7. Screening for diabetes mellitus  Glucose     Medical decision making: Patient is a 25-year-old with history of intermittent asthma, history of pneumomediastinum and recurrent major depression currently in remission who presents today for annual physical exam.  She is concerned about sleep due to nightmares.  We had a discussion and we will start with the prazosin.  If no benefit, I would recommend psychotherapy.  Screening labs for health maintenance ordered for her and she will come for a future lab only visit.   Healthy female exam.      Plan:       All questions answered.  Diagnosis explained in detail, including differential.  Discussed healthy lifestyle modifications.  Educational material distributed.  Follow up: Return in about 6 months (around 11/17/2021) for recheck, Sooner if not improving or worsening.     Subjective:     Chief Complaint   Patient presents with     Establish Care     Used to be seen at [facility], PHQ9 and GAD7 done today      Genevieve Ryan is a 25 y.o. female who presents for an annual exam. The patient is not currently sexually active. The patient participates in regular exercise: yes. The patient reports that there is not domestic violence in her life.   Patient is here to establish care.  She has a diagnosis of asthma that she believes precedes her diagnosis of celiac disease.  1 she has had weight out from her diet, she has had minimal symptoms.  She hardly uses her albuterol or Atrovent.  She has had 2 episode of pneumomediastinum.  Once was when she was having active asthma.  The second 1 was in 2018.  This was while  she was drinking alcohol and had vomiting and she was told by her specialist that due to her underlying lung condition, she had a pneumomediastinum.  She is not drinking any alcohol now.  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  She enjoys walking and kayaking with her brother.    Patient has depression that is under remission.  However, she feels her insomnia is persistent.  This is due to nightmares and difficulty falling asleep because of fear of nightmares.  Her nightmares were in remission while she was drinking alcohol.  She denies any trauma in particular.  She informs me that she is always been an anxious child.  Her nightmares usually consist of seeing animals like small cats from big cats, running away etc.    Healthy Habits:   Regular Exercise: Yes  Sunscreen Use: Yes  Healthy Diet: Yes  Dental Visits Regularly: Yes  Seat Belt: Yes  Sexually active: No  Self Breast Exam Monthly:No  Hemoccults: N/A  Flex Sig: N/A  Colonoscopy: N/A  Lipid Profile: No  Glucose Screen: No  Prevention of Osteoporosis: N/A  Last Dexa: N/A  Guns at Home:  No      Immunization History   Administered Date(s) Administered     DTaP, historic 1997, 1997, 01/26/1998, 10/28/1998, 07/22/2002     HPV 9 Valent 05/17/2021     HPV Quadrivalent 08/27/2009, 11/10/2009     Hep A, historic 08/27/2009     Hep B, Adult 06/25/2014     Hep B, historic 1997, 1997, 10/28/1998     Hepatitis A, Ped/Adol 2 Dose IM (18yr & under) 08/27/2009     HiB, historic,unspecified 1997, 1997, 10/28/1998     INFLUENZA,SEASONAL QUAD, PF, =/> 6months 02/29/2016, 01/09/2017, 09/11/2018     IPV 1997, 1997, 01/26/1998, 07/22/2002     Influenza [id number], 11/10/2009     Influenza, Seasonal, Inj PF IIV3 09/22/2010     Influenza, inj, historic,unspecified 10/17/2005, 11/21/2005, 10/18/2008, 09/30/2009, 09/10/2014, 10/27/2019     Influenza, seasonal,quad inj 6-35 mos 10/10/2013     MMR 07/28/1998,  07/22/2002     Meningococcal MCV4P 08/27/2009     Pneumo Polysac 23-V 05/17/2021     Tdap 08/27/2009, 06/25/2014     Varicella 07/28/1998, 08/27/2009     Immunization status: due today.    No exam data present    Gynecologic History  No LMP recorded.  Contraception: OCP (estrogen/progesterone)  Last Pap: 2020. Results were: normal Partner's OB GYN      OB History   No obstetric history on file.       Current Outpatient Medications   Medication Sig Dispense Refill     adapalene (DIFFERIN) 0.3 % gel Apply to the affected area daily 45 g 3     albuterol (PROAIR HFA;PROVENTIL HFA;VENTOLIN HFA) 90 mcg/actuation inhaler Inhale 2 puffs every 4 (four) hours as needed. 1 Inhaler 0     albuterol (PROVENTIL) 2.5 mg /3 mL (0.083 %) nebulizer solution Take 3 mL (2.5 mg total) by nebulization every 4 (four) hours as needed. 75 mL 0     benzoyl peroxide 2.5 % topical gel Apply to the skin two times a day 42.5 g 3     clindamycin (CLEOCIN T) 1 % external solution Apply 1 application topically 2 (two) times a day. 30 mL 3     drospirenone-ethinyl estradiol (VESTURA, 28,) 3-0.02 mg per tablet TAKE 1 TABLET BY MOUTH EVERY DAY 28 tablet 12     FLUoxetine (PROZAC) 40 MG capsule Take 1 capsule (40 mg total) by mouth daily. 90 capsule 3     ipratropium (ATROVENT) 0.02 % nebulizer solution Take 500 mcg by nebulization as needed.        melatonin 5 mg Tab Take 1 tablet by mouth bedtime.       traZODone (DESYREL) 50 MG tablet Take 1 tablet (50 mg total) by mouth at bedtime. 90 tablet 1     prazosin (MINIPRESS) 1 MG capsule Take 2 capsules (2 mg total) by mouth at bedtime. Take 1 capsule for the first week 60 capsule 1     No current facility-administered medications for this visit.      History reviewed. No pertinent past medical history.  Past Surgical History:   Procedure Laterality Date     TX NASAL/MAXILL SINUS ENDOSCOPY,DX      Description: Nasal Endoscopy - Maxillary Sinus;  Recorded: 05/31/2007;     TX REMOVAL ADENOIDS,PRIMARY,<11 Y/O       Description: Adenoidectomy;  Recorded: 05/31/2007;     Amoxicillin-pot clavulanate, Singulair [montelukast], and Ondansetron  Family History   Problem Relation Age of Onset     Hypertension Mother      Alcoholism Father      No Medical Problems Brother      Alzheimer's disease Maternal Grandmother      Stroke Maternal Grandfather         90+     Parkinsonism Paternal Grandmother      No Medical Problems Paternal Grandfather         90+     Social History     Socioeconomic History     Marital status: Single     Spouse name: Not on file     Number of children: Not on file     Years of education: Not on file     Highest education level: Not on file   Occupational History     Not on file   Social Needs     Financial resource strain: Not on file     Food insecurity     Worry: Not on file     Inability: Not on file     Transportation needs     Medical: Not on file     Non-medical: Not on file   Tobacco Use     Smoking status: Never Smoker     Smokeless tobacco: Never Used   Substance and Sexual Activity     Alcohol use: Not Currently     Comment: Quit for 150 days     Drug use: Never     Sexual activity: Not on file   Lifestyle     Physical activity     Days per week: Not on file     Minutes per session: Not on file     Stress: Not on file   Relationships     Social connections     Talks on phone: Not on file     Gets together: Not on file     Attends Buddhism service: Not on file     Active member of club or organization: Not on file     Attends meetings of clubs or organizations: Not on file     Relationship status: Not on file     Intimate partner violence     Fear of current or ex partner: Not on file     Emotionally abused: Not on file     Physically abused: Not on file     Forced sexual activity: Not on file   Other Topics Concern     Not on file   Social History Narrative    Going to Grad.     Adult education/     Single.    Dimitri Rodriguez MD  5/17/2021               Review of Systems  General:   "Denies problem  Eyes: Denies problem  Ears/Nose/Throat: Denies problem  Cardiovascular: Denies problem  Respiratory:  Denies problem  Gastrointestinal:  Denies problem, Genitourinary: Denies problem  Musculoskeletal:  Denies problem  Skin: Denies problem  Neurologic: Denies problem  Psychiatric: Denies problem  Endocrine: Denies problem  Heme/Lymphatic: Denies problem   Allergic/Immunologic: Denies problem        Objective:         Vitals:    05/17/21 1413   BP: 122/80   Pulse: 74   SpO2: 97%   Weight: 157 lb 6.4 oz (71.4 kg)   Height: 5' 2.5\" (1.588 m)     Body mass index is 28.33 kg/m .    Physical Exam:  GENERAL: Healthy, alert and no distress  EYES: Eyes grossly normal to inspection. No discharge or erythema, or obvious scleral/conjunctival abnormalities.  HENT: ear canals and TM's normal  NECK: no adenopathy, thyroid normal to palpation, trachea midline and normal to palpation and no carotid bruits  RESP: lungs clear to auscultation - no rales, rhonchi or wheezes  CV: regular rates and rhythm  NEURO: Cranial nerves grossly intact. Mentation and speech appropriate for age.  PSYCH: Mentation appears normal, affect normal/bright, judgement and insight intact, normal speech and appearance well-groomed       Little interest or pleasure in doing things: Not at all  Feeling down, depressed, or hopeless: Not at all  Trouble falling or staying asleep, or sleeping too much: Nearly every day  Feeling tired or having little energy: Nearly every day  Poor appetite or overeating: Not at all  Feeling bad about yourself - or that you are a failure or have let yourself or your family down: Not at all  Trouble concentrating on things, such as reading the newspaper or watching television: Not at all  Moving or speaking so slowly that other people could have noticed. Or the opposite - being so fidgety or restless that you have been moving around a lot more than usual: Not at all  Thoughts that you would be better off dead, or of " hurting yourself in some way: Not at all  PHQ-9 Total Score: 6  If you checked off any problems, how difficult have these problems made it for you to do your work, take care of things at home, or get along with other people?: Somewhat difficult    How difficult did these problems make it for you to do your work, take care of things at home or get along with other people? : Not difficult at all (2021  3:00 PM)  Feeling nervous, anxious, or on edge: 1 (2021  3:00 PM)  Not being able to stop or control worryin (2021  3:00 PM)  Worrying too much about different things: 0 (2021  3:00 PM)  Trouble relaxin (2021  3:00 PM)  Being so restless that it's hard to sit still: 0 (2021  3:00 PM)  Becoming easily annoyed or irritable: 0 (2021  3:00 PM)  Feeling afraid as if something awful might happen: 0 (2021  3:00 PM)  ONDINA 7 Total Score: 1 (2021  3:00 PM)  How difficult did these problems make it for you to do your work, take care of things at home or get along with other people? : Not difficult at all (2021  3:00 PM)

## 2021-06-18 RX ORDER — ADAPALENE GEL USP, 0.3% 3 MG/G
GEL TOPICAL
Qty: 45 G | Refills: 3 | Status: SHIPPED | OUTPATIENT
Start: 2021-06-18 | End: 2021-11-16

## 2021-06-18 RX ORDER — BENZOYL PEROXIDE 2.5 G/100G
GEL TOPICAL
Qty: 60 G | Refills: 3 | Status: SHIPPED | OUTPATIENT
Start: 2021-06-18 | End: 2021-11-16

## 2021-06-18 NOTE — PATIENT INSTRUCTIONS - HE
Patient Instructions by Dimitri Rodriguez MD at 5/17/2021  2:30 PM     Author: Dimitri Rodriguez MD Service: -- Author Type: Physician    Filed: 5/17/2021  2:54 PM Encounter Date: 5/17/2021 Status: Signed    : Dimitri Rodriguez MD (Physician)       Patient Education     Prevention Guidelines, Women Ages 18 to 39  Screening tests and vaccines are an important part of managing your health. A screening test is done to find possible disorders or diseases in people who don't have any symptoms. The goal is to find a disease early so lifestyle changes can be made and you can be watched more closely to reduce the risk of disease, or to detect it early enough to treat it most effectively. Screening tests are not considered diagnostic, but are used to determine if more testing is needed. Health counseling is essential, too. Below are guidelines for these, for women ages 18 to 39. Talk with your healthcare provider to make sure youre up-to-date on what you need.  Screening Who needs it How often   Alcohol misuse All women in this age group At routine exams   Blood pressure All women in this age group Yearly checkup if your blood pressure is normal  Normal blood pressure is less than 120/80 mm Hg  If your blood pressure reading is higher than normal, follow the advice of your healthcare provider   Breast cancer All women in this age group should talk with their healthcare providers about the need for clinical breast exams (CBE)1 Clinical breast exam every 3 years1   Cervical cancer Women ages 21 and older Women between ages 21 and 29 should have a Pap test every 3 years; women between ages 30 and 65 are advised to have a Pap test plus an HPV test every 5 years   Chlamydia Sexually active women ages 25 and younger, and women at increased risk for infection (such as having multiple sex partners) Every year if you're at risk or have symptoms   Depression All women in this age group At routine exams   Type 2 diabetes,  prediabetes All women with no symptoms who are overweight or obese and have 1 or more other risk factors for diabetes At least every 3 years. Also, testing for diabetes during pregnancy after the 24th week.    Type 2 diabetes, prediabetes All women diagnosed with gestational diabetes Lifelong testing every 3 years   Type 2 diabetes All women with prediabetes Every year   Gonorrhea Sexually active women at increased risk for infection At routine exams   Hepatitis C Anyone at increased risk At routine exams   HIV All women should be tested at least once for HIV between the ages of 13 and 64 At routine exams. Those with risk factors for HIV should be tested at least annually.    Obesity All women in this age group At routine exams   Syphilis Women at increased risk for infection should talk with their healthcare provider At routine exams   Tuberculosis Women at increased risk for infection should talk with their healthcare provider Ask your healthcare provider   Vision All women in this age group At least 1 complete exam in your 20s, and 2 in your 30s   Vaccine2 Who needs it How often   Chickenpox (varicella) All women in this age group who have no record of this infection or vaccine 2 doses; the second dose should be given 4 to 8 weeks after the first dose   Hepatitis A Women at increased risk for infection should talk with their healthcare provider 2 doses given at least 6 months apart   Hepatitis B Women at increased risk for infection should talk with their healthcare provider 3 doses over 6 months; second dose should be given 1 month after the first dose; the third dose should be given at least 2 months after the second dose and at least 4 months after the first dose   Haemophilus influenzae Type B (HIB) Women at increased risk for infection should talk with their healthcare provider 1 to 3 doses   Human papillomavirus (HPV) All women in this age group up to age 26 3 doses; the second dose should be given 1 to 2  months after the first dose and the third dose given 6 months after the first dose   Influenza (flu) All women in this age group Once a year   Measles, mumps, rubella (MMR) All women in this age group who have no record of these infections or vaccines 1 or 2 doses   Meningococcal Women at increased risk for infection should talk with their healthcare provider 1 or more doses   Pneumococcal conjugate vaccine (PCV13) and pneumococcal polysaccharide vaccine (PPSV23) Women at increased risk for infection should talk with their healthcare provider PCV13: 1 dose ages 19 to 65 (protects against 13 types of pneumococcal bacteria)  PPSV23: 1 to 2 doses through age 64, or 1 dose at 65 or older (protects against 23 types of pneumococcal bacteria)      Tetanus/diphtheria/pertussis (Td/Tdap) booster All women in this age group Td every 10 years, or a one-time dose of Tdap instead of a Td booster after age 18, then Td every 10 years   Counseling Who needs it How often   BRCA gene mutation testing for breast and ovarian cancer susceptibility Women with increased risk for having gene mutation When your risk is known   Breast cancer and chemoprevention Women at high risk for breast cancer When your risk is known   Diet and exercise Women who are overweight or obese When diagnosed, and then at routine exams   Domestic violence Women at the age in which they are able to have children At routine exams   Sexually transmitted infection prevention Women who are sexually active At routine exams   Skin cancer Prevention of skin cancer in fair-skinned adults At routine exams   Use of tobacco and the health effects it can cause All women in this age group Every visit   1 According to the ACS, women ages 20 to 39 years should have a clinical breast exam (CBE) as part of their routine health exam every 3 years. Breast self-exams are an option for women starting in their 20s. But the USPSTF does not recommend CBE.  Date Last Reviewed:  10/1/2017    3694-2215 [employer]. [address] [place] [address], [place] [address]. All rights reserved. This information is not intended as a substitute for professional medical care. Always follow your healthcare professional's instructions.

## 2021-06-21 NOTE — LETTER
Letter by Dimitri Rodriguez MD at      Author: Dimitri Rodriguez MD Service: -- Author Type: --    Filed:  Encounter Date: 5/17/2021 Status: (Other)       My Asthma Action Plan     Name: Genevieve Ryan   YOB: 1995  Date: 5/17/2021   My doctor: Dimitri Rodriguez MD   My clinic: [facility]        My Rescue Medicine:   Albuterol (Proair/Ventolin/Proventil HFA) 2-4 puffs EVERY 4 HOURS as needed. Use a spacer if recommended by your provider.   My Asthma Severity:   Intermittent/Exercise Induced  Know your asthma triggers: Believes it is from gluten             GREEN ZONE   Good Control    I feel good    No cough or wheeze    Can work, sleep and play without asthma symptoms     Take your asthma control medicine every day.     1. If exercise triggers your asthma, take your rescue medication    15 minutes before exercise or sports, and    During exercise if you have asthma symptoms  2. Spacer to use with inhaler: If you have a spacer, make sure to use it with your inhaler             YELLOW ZONE Getting Worse  I have ANY of these:    I do not feel good    Cough or wheeze    Chest feels tight    Wake up at night 1. Keep taking your Green Zone medications  2. Start taking your rescue medicine:    every 20 minutes for up to 1 hour. Then every 4 hours for 24-48 hours.  3. If you stay in the Yellow Zone for more than 12-24 hours, contact your doctor.  4. If you do not return to the Green Zone in 12-24 hours or you get worse, start taking your oral steroid medicine if prescribed by your provider.           RED ZONE Medical Alert - Get Help  I have ANY of these:    I feel awful    Medicine is not helping    Breathing getting harder    Trouble walking or talking    Nose opens wide to breathe     1. Take your rescue medicine NOW  2. If your provider has prescribed an oral steroid medicine, start taking it NOW  3. Call your doctor NOW  4. If you are still in the Red Zone after 20  minutes and you have not reached your doctor:    Take your rescue medicine again and    Call 911 or go to the emergency room right away    See your regular doctor within 2 weeks of an Emergency Room or Urgent Care visit for follow-up treatment.          Annual Reminders:  Meet with Asthma Educator,  Flu Shot in the Fall, consider Pneumonia Vaccination for patients with asthma (aged 19 and older).    Pharmacy:   [id number] - [place] [id number] AT [facility] [address]  [address]  [facility] [address]  Phone: [phone] Fax: [phone]    [facility] #[id number] - [place], [place] - [address] AT [address] [place] [address]  Phone: [phone] Fax: [phone]    [facility] #[id number] - [address] & [address] [address]  Phone: [phone] Fax: [phone]      Electronically signed by Dmiitri Rodriguez MD   Date: 05/17/21                      Asthma Triggers  How To Control Things That Make Your Asthma Worse    Triggers are things that make your asthma worse.  Look at the list below to help you find your triggers and what you can do about them.  You can help prevent asthma flare-ups by staying away from your triggers.      Trigger                                                          What you can do   Cigarette Smoke  Tobacco smoke can make asthma worse. Do not allow smoking in your home, car or around you.  Be sure no one smokes at a [employer] day care or school.  If you smoke, ask your health care provider for ways to help you quit.  Ask family members to quit too.  Ask your health care provider for a referral to Quit Plan to help you quit smoking, or call 8-710-588-PLAN.     Colds, Flu, Bronchitis  These are common triggers of asthma. Wash your hands often.  Dont touch your eyes, nose or mouth.  Get a flu shot every  year.     Dust Mites  These are tiny bugs that live in cloth or carpet. They are too small to see. Wash sheets and blankets in hot water every week.   Encase pillows and mattress in dust mite proof covers.  Avoid having carpet if you can. If you have carpet, vacuum weekly.   Use a dust mask and HEPA vacuum.   Pollen and Outdoor Mold  Some people are allergic to trees, grass, or weed pollen, or molds. Try to keep your windows closed.  Limit time out doors when pollen count is high.   Ask you health care provider about taking medicine during allergy season.     Animal Dander  Some people are allergic to skin flakes, urine or saliva from pets with fur or feathers. Keep pets with fur or feathers out of your home.    If you cant keep the pet outdoors, then keep the pet out of your bedroom.  Keep the bedroom door closed.  Keep pets off cloth furniture and away from stuffed toys.     Mice, Rats, and Cockroaches  Some people are allergic to the waste from these pests.   Cover food and garbage.  Clean up spills and food crumbs.  Store grease in the refrigerator.   Keep food out of the bedroom.   Indoor Mold  This can be a trigger if your home has high moisture. Fix leaking faucets, pipes, or other sources of water.   Clean moldy surfaces.  Dehumidify basement if it is damp and smelly.   Smoke, Strong Odors, and Sprays  These can reduce air quality. Stay away from strong odors and sprays, such as perfume, powder, hair spray, paints, smoke incense, paint, cleaning products, candles and new carpet.   Exercise or Sports  Some people with asthma have this trigger. Be active!  Ask your doctor about taking medicine before sports or exercise to prevent symptoms.    Warm up for 5-10 minutes before and after sports or exercise.     Other Triggers of Asthma  Cold air:  Cover your nose and mouth with a scarf.  Sometimes laughing or crying can be a trigger.  Some medicines and food can trigger asthma.

## 2021-06-26 NOTE — TELEPHONE ENCOUNTER
The Differin Gel is actually not covered by patient's insurance, a prior authorization is required.

## 2021-06-26 NOTE — TELEPHONE ENCOUNTER
Central PA team  [phone]  Pool: HE PA MED ([id number])          PA has been initiated.       PA form completed and faxed insurance via Cover My Meds     Key:  DANIAL DESAI Key: [id number]     Medication:  Adapalene 0.3% gel    Insurance:  [employer]        Response will be received via fax and may take up to 5-10 business days depending on plan

## 2021-06-26 NOTE — TELEPHONE ENCOUNTER
Prior Authorization Request  Who s requesting:  Pharmacy  Pharmacy Name and Location: [facility]  Medication Name: Adapalene 0.3% Gel  Insurance Plan: [employer]   Insurance Member ID Number:  [id number]  CoverMyMeds Key: [id number]  Informed patient that prior authorizations can take up to 10 business days for response:   No  Okay to leave a detailed message: Yes

## 2021-06-26 NOTE — TELEPHONE ENCOUNTER
Central PA team  [phone]  Pool: HE PA MED ([id number])          PA has been initiated.       PA form completed and faxed insurance via Cover My Meds     Key:  [id number]      Medication:  Adapalene 0.3% gel    Insurance:  [employer]        Response will be received via fax and may take up to 5-10 business days depending on plan

## 2021-06-30 ENCOUNTER — COMMUNICATION - HEALTHEAST (OUTPATIENT)
Dept: FAMILY MEDICINE | Facility: CLINIC | Age: 26
End: 2021-06-30

## 2021-06-30 DIAGNOSIS — G47.9 SLEEP DISORDER: ICD-10-CM

## 2021-07-01 RX ORDER — TRAZODONE HYDROCHLORIDE 50 MG/1
100 TABLET, FILM COATED ORAL AT BEDTIME
Qty: 60 TABLET | Refills: 1 | Status: SHIPPED | OUTPATIENT
Start: 2021-07-01 | End: 2021-11-16

## 2021-07-03 NOTE — ADDENDUM NOTE
Addendum Note by Jovi Ochoa MD at 10/29/2020 10:15 AM     Author: Jovi Ochoa MD Service: -- Author Type: Physician    Filed: 10/30/2020  3:18 PM Encounter Date: 10/29/2020 Status: Signed    : Jovi Ochoa MD (Physician)    Addended by: JOVI OCHOA on: 10/30/2020 03:18 PM        Modules accepted: Orders

## 2021-07-04 NOTE — TELEPHONE ENCOUNTER
Telephone Encounter by Desi Whyte MD at 7/1/2021  1:02 PM     Author: Desi Whyte MD Service: -- Author Type: Physician    Filed: 7/1/2021  1:03 PM Encounter Date: 6/30/2021 Status: Signed    : Desi Whyte MD (Physician)       Yes, she can 2 of the 50 mg tablets.  I sent refills.

## 2021-07-04 NOTE — TELEPHONE ENCOUNTER
Telephone Encounter by Christine Meng RT (R) at 6/30/2021  2:44 PM     Author: Christine Meng RT (R) Service: -- Author Type: Radiologic Technologist    Filed: 6/30/2021  2:45 PM Encounter Date: 6/30/2021 Status: Signed    : Christine Meng RT (R) (Radiologic Technologist)       LMTCB and schedule Office visit with Dr Rodriguez or an available provider in regards to your dosing change.

## 2021-07-04 NOTE — TELEPHONE ENCOUNTER
Telephone Encounter by Lidya Ramirez MA at 6/30/2021  9:55 AM     Author: Lidya Ramirez MA Service: -- Author Type: Certified Medical Assistant    Filed: 6/30/2021 10:01 AM Encounter Date: 6/30/2021 Status: Signed    : Lidya Ramirez MA (Certified Medical Assistant)       Incoming call from patient with update. She wanted to let you know the prazosin did not help at all for the nightmares so she did not refill it. She has been seeing a counselor and her counselor recommended she ask you if she could increase her trazodone dose to 100mg? She is also asking for a referral to psychiatry to help manage her medications. Please advise on dosage change. Referral pended

## 2021-07-04 NOTE — TELEPHONE ENCOUNTER
Telephone Encounter by Bradford Gunn MD at 6/30/2021 11:45 AM     Author: Bradford Gunn MD Service: -- Author Type: Physician    Filed: 6/30/2021 11:46 AM Encounter Date: 6/30/2021 Status: Signed    : Bradford Gunn MD (Physician)       Schedule appt with Dr Rodriguez or available provider

## 2021-07-04 NOTE — TELEPHONE ENCOUNTER
Telephone Encounter by Lidya Ramirez MA at 7/1/2021  2:26 PM     Author: Lidya Ramirez MA Service: -- Author Type: Certified Medical Assistant    Filed: 7/1/2021  2:26 PM Encounter Date: 6/30/2021 Status: Signed    : Lidya Ramirez MA (Certified Medical Assistant)       Left message for pt letting her know new Rx was sent

## 2021-07-04 NOTE — TELEPHONE ENCOUNTER
Telephone Encounter by Ciara Morales at 6/22/2021 11:35 AM     Author: Ciara Morales Service: -- Author Type: --    Filed: 6/22/2021 11:36 AM Encounter Date: 6/17/2021 Status: Signed    : Ciara Morales       No PA needed, medication is covered under plan.  Called [facility] to see what type of rejection they are receiving from insurance.   Per technician this was sent in error, pharmacy has a paid claim and patient picked up already.  Closing encounter.

## 2021-07-04 NOTE — TELEPHONE ENCOUNTER
Telephone Encounter by Lm Garcia LPN at 6/30/2021  2:58 PM     Author: Lm Garcia LPN Service: -- Author Type: Licensed Nurse    Filed: 6/30/2021  3:05 PM Encounter Date: 6/30/2021 Status: Signed    : Lm Garcia LPN (Licensed Nurse)       Pt called back and scheduled an appointment with Dr NATALY acosta next Friday 7/9. Pt was very tearful sounding and asking again for for an increase of her trazodone to 100 mg. She is continuing to have nightmares and unable to sleep. She saw her counselor today and they recommended an increase by her PCP. Pt prefers to not have to go through seeing another new doctor since she has established with Dr INGRAM. Ok to increase until next weeks appointment?

## 2021-07-09 ENCOUNTER — OFFICE VISIT - HEALTHEAST (OUTPATIENT)
Dept: FAMILY MEDICINE | Facility: CLINIC | Age: 26
End: 2021-07-09

## 2021-07-09 DIAGNOSIS — F51.5 NIGHTMARES: ICD-10-CM

## 2021-07-09 DIAGNOSIS — F41.9 ANXIETY: ICD-10-CM

## 2021-07-09 DIAGNOSIS — G47.00 INSOMNIA, UNSPECIFIED TYPE: ICD-10-CM

## 2021-07-09 RX ORDER — GABAPENTIN 100 MG/1
200 CAPSULE ORAL AT BEDTIME
Qty: 30 CAPSULE | Refills: 0 | Status: SHIPPED | OUTPATIENT
Start: 2021-07-09 | End: 2021-07-21

## 2021-07-10 VITALS
WEIGHT: 152.6 LBS | BODY MASS INDEX: 27.47 KG/M2 | OXYGEN SATURATION: 97 % | RESPIRATION RATE: 16 BRPM | DIASTOLIC BLOOD PRESSURE: 73 MMHG | TEMPERATURE: 98.1 F | HEART RATE: 80 BPM | SYSTOLIC BLOOD PRESSURE: 113 MMHG
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
## 2021-07-21 DIAGNOSIS — F41.9 ANXIETY: ICD-10-CM

## 2021-07-21 DIAGNOSIS — F51.5 NIGHTMARES: ICD-10-CM
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
## 2021-07-22 RX ORDER — GABAPENTIN 100 MG/1
200 CAPSULE ORAL AT BEDTIME
Qty: 60 CAPSULE | Refills: 0 | Status: SHIPPED | OUTPATIENT
Start: 2021-07-22 | End: 2021-08-24

## 2021-08-24 DIAGNOSIS — F41.9 ANXIETY: ICD-10-CM

## 2021-08-24 DIAGNOSIS — F51.5 NIGHTMARES: ICD-10-CM

## 2021-08-24 RX ORDER — GABAPENTIN 100 MG/1
200 CAPSULE ORAL AT BEDTIME
Qty: 60 CAPSULE | Refills: 0 | Status: SHIPPED | OUTPATIENT
Start: 2021-08-24

## 2021-09-27 ENCOUNTER — NURSE TRIAGE (OUTPATIENT)
Dept: NURSING | Facility: CLINIC | Age: 26
End: 2021-09-27

## 2021-09-27 DIAGNOSIS — F51.5 NIGHTMARES: Primary | ICD-10-CM

## 2021-09-27 RX ORDER — GABAPENTIN 100 MG/1
200 CAPSULE ORAL AT BEDTIME
Qty: 14 CAPSULE | Refills: 0 | Status: SHIPPED | OUTPATIENT
Start: 2021-09-27 | End: 2021-11-16

## 2021-09-27 RX ORDER — GABAPENTIN 100 MG/1
200 CAPSULE ORAL AT BEDTIME
Qty: 14 CAPSULE | Refills: 0 | Status: SHIPPED | OUTPATIENT
Start: 2021-09-27 | End: 2021-09-27

## 2021-09-28 NOTE — TELEPHONE ENCOUNTER
"Mother with CBO is requesting  A bridge refill of gabapentin tht patient is taking for  Mental health reasons; states  that original prescriber Dr. Rodriguez did not want to refill it  And  [facility] psychiatrist should   States that  psychiatrist refill is pending but patient has been without it for   24 hours and   would like a bridge prescription  In the meantime   Contacted Dr. Martinez  On call  For    And   Granted one week bridge   Order  Completed and e scribed and received by  Pharmacy   [facility] left of mother Gina  Tona Demarco RN  FNA        Reason for Disposition    [1] Request for URGENT new prescription or refill of \"essential\" medication (i.e., likelihood of harm to patient if not taken) AND [2] triager unable to fill per unit policy    Protocols used: MEDICATION QUESTION CALL-A-AH      "

## 2021-10-01 DIAGNOSIS — F51.5 NIGHTMARES: ICD-10-CM

## 2021-10-02 NOTE — TELEPHONE ENCOUNTER
Last OV 3/9/17.  Last refilled 1/10/17 #90 x 1.   Routing refill request to provider for review/approval because:  Drug not on the G refill protocol     Last Written Prescription Date:  9/27/21  Last Fill Quantity: 14,  # refills: 0   Last office visit provider:  7/9/21     Requested Prescriptions   Pending Prescriptions Disp Refills     gabapentin (NEURONTIN) 100 MG capsule [Pharmacy Med Name: GABAPENTIN 100MG CAPSULES] 14 capsule 0     Sig: TAKE 2 CAPSULES(200 MG) BY MOUTH AT BEDTIME FOR 7 DAYS       There is no refill protocol information for this order          Renetta Briscoe RN 10/02/21 2:52 PM

## 2021-10-03 ENCOUNTER — HEALTH MAINTENANCE LETTER (OUTPATIENT)
Age: 26
End: 2021-10-03

## 2021-10-04 RX ORDER — GABAPENTIN 100 MG/1
CAPSULE ORAL
Qty: 14 CAPSULE | Refills: 0 | OUTPATIENT
Start: 2021-10-04

## 2021-10-04 NOTE — TELEPHONE ENCOUNTER
Please asked patient if she has now got a refill from psychiatry or does she need refills.    Dimitri Rodriguez MD

## 2021-10-05 NOTE — TELEPHONE ENCOUNTER
LMTCB - Please review message from Dr. INGRAM with pt.      Please asked patient if she has now got a refill from psychiatry or does she need refills.     Dimitri Rodriguez MD

## 2021-11-16 ENCOUNTER — NURSE TRIAGE (OUTPATIENT)
Dept: NURSING | Facility: CLINIC | Age: 26
End: 2021-11-16

## 2021-11-16 ENCOUNTER — OFFICE VISIT (OUTPATIENT)
Dept: FAMILY MEDICINE | Facility: CLINIC | Age: 26
End: 2021-11-16
Payer: COMMERCIAL

## 2021-11-16 VITALS
DIASTOLIC BLOOD PRESSURE: 80 MMHG | HEART RATE: 105 BPM | SYSTOLIC BLOOD PRESSURE: 108 MMHG | BODY MASS INDEX: 28.6 KG/M2 | HEIGHT: 63 IN | WEIGHT: 161.4 LBS | OXYGEN SATURATION: 97 %

## 2021-11-16 DIAGNOSIS — R42 LIGHTHEADEDNESS: ICD-10-CM

## 2021-11-16 DIAGNOSIS — R42 DIZZINESS: Primary | ICD-10-CM

## 2021-11-16 LAB
ALBUMIN UR-MCNC: NEGATIVE MG/DL
ANION GAP SERPL CALCULATED.3IONS-SCNC: 16 MMOL/L (ref 5–18)
APPEARANCE UR: CLEAR
BASOPHILS # BLD AUTO: 0 10E3/UL (ref 0–0.2)
BASOPHILS NFR BLD AUTO: 1 %
BILIRUB UR QL STRIP: NEGATIVE
BUN SERPL-MCNC: 10 MG/DL (ref 8–22)
CALCIUM SERPL-MCNC: 9.8 MG/DL (ref 8.5–10.5)
CHLORIDE BLD-SCNC: 107 MMOL/L (ref 98–107)
CO2 SERPL-SCNC: 16 MMOL/L (ref 22–31)
COLOR UR AUTO: YELLOW
CREAT SERPL-MCNC: 0.76 MG/DL (ref 0.6–1.1)
EOSINOPHIL # BLD AUTO: 0.1 10E3/UL (ref 0–0.7)
EOSINOPHIL NFR BLD AUTO: 1 %
ERYTHROCYTE [DISTWIDTH] IN BLOOD BY AUTOMATED COUNT: 12 % (ref 10–15)
GFR SERPL CREATININE-BSD FRML MDRD: >90 ML/MIN/1.73M2
GLUCOSE BLD-MCNC: 96 MG/DL (ref 70–125)
GLUCOSE UR STRIP-MCNC: NEGATIVE MG/DL
HCT VFR BLD AUTO: 37.1 % (ref 35–47)
HGB BLD-MCNC: 12.7 G/DL (ref 11.7–15.7)
HGB UR QL STRIP: NEGATIVE
IMM GRANULOCYTES # BLD: 0 10E3/UL
IMM GRANULOCYTES NFR BLD: 0 %
KETONES UR STRIP-MCNC: NEGATIVE MG/DL
LEUKOCYTE ESTERASE UR QL STRIP: NEGATIVE
LYMPHOCYTES # BLD AUTO: 2.7 10E3/UL (ref 0.8–5.3)
LYMPHOCYTES NFR BLD AUTO: 37 %
MCH RBC QN AUTO: 28.2 PG (ref 26.5–33)
MCHC RBC AUTO-ENTMCNC: 34.2 G/DL (ref 31.5–36.5)
MCV RBC AUTO: 82 FL (ref 78–100)
MONOCYTES # BLD AUTO: 0.5 10E3/UL (ref 0–1.3)
MONOCYTES NFR BLD AUTO: 7 %
NEUTROPHILS # BLD AUTO: 4 10E3/UL (ref 1.6–8.3)
NEUTROPHILS NFR BLD AUTO: 54 %
NITRATE UR QL: NEGATIVE
PH UR STRIP: 7.5 [PH] (ref 5–8)
PLATELET # BLD AUTO: 344 10E3/UL (ref 150–450)
POTASSIUM BLD-SCNC: 4 MMOL/L (ref 3.5–5)
RBC # BLD AUTO: 4.51 10E6/UL (ref 3.8–5.2)
SODIUM SERPL-SCNC: 139 MMOL/L (ref 136–145)
SP GR UR STRIP: 1.02 (ref 1–1.03)
TSH SERPL DL<=0.005 MIU/L-ACNC: 3.45 UIU/ML (ref 0.3–5)
UROBILINOGEN UR STRIP-ACNC: 1 E.U./DL
WBC # BLD AUTO: 7.4 10E3/UL (ref 4–11)

## 2021-11-16 PROCEDURE — 84443 ASSAY THYROID STIM HORMONE: CPT | Performed by: FAMILY MEDICINE

## 2021-11-16 PROCEDURE — 36415 COLL VENOUS BLD VENIPUNCTURE: CPT | Performed by: FAMILY MEDICINE

## 2021-11-16 PROCEDURE — 80048 BASIC METABOLIC PNL TOTAL CA: CPT | Performed by: FAMILY MEDICINE

## 2021-11-16 PROCEDURE — 99214 OFFICE O/P EST MOD 30 MIN: CPT | Performed by: FAMILY MEDICINE

## 2021-11-16 PROCEDURE — 81003 URINALYSIS AUTO W/O SCOPE: CPT | Performed by: FAMILY MEDICINE

## 2021-11-16 PROCEDURE — 85025 COMPLETE CBC W/AUTO DIFF WBC: CPT | Performed by: FAMILY MEDICINE

## 2021-11-16 RX ORDER — MECLIZINE HYDROCHLORIDE 25 MG/1
25 TABLET ORAL DAILY PRN
Qty: 20 TABLET | Refills: 0 | Status: SHIPPED | OUTPATIENT
Start: 2021-11-16 | End: 2022-08-26

## 2021-11-16 ASSESSMENT — MIFFLIN-ST. JEOR: SCORE: 1443.3

## 2021-11-16 NOTE — PROGRESS NOTES
"  Assessment & Plan     Dizziness  - Basic metabolic panel  (Ca, Cl, CO2, Creat, Gluc, K, Na, BUN)  - TSH with free T4 reflex  - CBC with platelets and differential  - meclizine (ANTIVERT) 25 MG tablet  Dispense: 20 tablet; Refill: 0  - Basic metabolic panel  (Ca, Cl, CO2, Creat, Gluc, K, Na, BUN)  - TSH with free T4 reflex  - CBC with platelets and differential    Lightheadedness  - TSH with free T4 reflex  - Urinalysis Macroscopic - lab collect  - TSH with free T4 reflex  - Urinalysis Macroscopic - lab collect  I think that the cause of dizziness or lightheadedness on the episodes might be associated with low blood pressure so she is unlikely having syncopal episodes.  I also think that she could be having some hypoglycemia at the time that she has these episodes.  Unfortunately she has eaten this afternoon as such we will not be able to figure out if there is hypoglycemia or not.  But I am going to get labs because of the possibility of hyperthyroidism because of the sweatiness and shakiness of the hands.  I will wait for the results.  But in the meantime she did also describe vertigo problems I am giving her some meclizine to hopefully help with the vertigo.  Encouraged her to call let us know if there is no improvement.       BMI:   Estimated body mass index is 29.05 kg/m  as calculated from the following:    Height as of this encounter: 1.588 m (5' 2.5\").    Weight as of this encounter: 73.2 kg (161 lb 6.4 oz).           No follow-ups on file.    Sarwat May MD  [facility]   Genevieve is a 26 year old who presents for the following health issues     HPI   She is here because of having episodes whereby she was suddenly start sweating, and have been lightheadedness which she described as being about to pass out.  She also describes sometimes settling down and having vertiginous symptoms with things moving around her.  There is no associated nausea.  She noted " having some headaches, and some fatigue.  She noted that some of the times that this will will be in the evening after she had come back from work.  It can also happen in the afternoon.  She noted no major changes in the weight.  Noted no difficulty with hearing at this time.  She is worried that she might have diabetes because she does have a strong family history of diabetes.  And noted no urinary symptoms.  No fevers and no chills.    Family History   Problem Relation Age of Onset     Hypertension Mother      Alcoholism Father      No Known Problems Brother      Alzheimer Disease Maternal Grandmother      Cerebrovascular Disease Maternal Grandfather         90+     Parkinsonism Paternal Grandmother      No Known Problems Paternal Grandfather         90+      Social History     Socioeconomic History     Marital status: Single     Spouse name: Not on file     Number of children: Not on file     Years of education: Not on file     Highest education level: Not on file   Occupational History     Not on file   Tobacco Use     Smoking status: Never Smoker     Smokeless tobacco: Never Used   Substance and Sexual Activity     Alcohol use: Not Currently     Comment: Alcoholic Drinks/day: Quit for 150 days     Drug use: Never     Sexual activity: Not on file   Other Topics Concern     Not on file   Social History Narrative    Going to Grad.   Adult education/   Single.  Dimitri Rodriguez MD  5/17/2021           Social Determinants of Health     Financial Resource Strain: Not on file   Food Insecurity: Not on file   Transportation Needs: Not on file   Physical Activity: Not on file   Stress: Not on file   Social Connections: Not on file   Intimate Partner Violence: Not on file   Housing Stability: Not on file      Past Surgical History:   Procedure Laterality Date     HC NASAL/SINUS ENDOSCOPY DIAG, W MAX SINUSOSCOPY      Description: Nasal Endoscopy - Maxillary Sinus;  Recorded: 05/31/2007;     HC REMOVAL  "ADENOIDS,PRIMARY,<11 Y/O      Description: Adenoidectomy;  Recorded: 05/31/2007;      No past medical history on file.       Review of Systems   CONSTITUTIONAL:NEGATIVE for fever, chills, change in weight but feels fatigue, malaise and  Has sweats  ENT/MOUTH: NEGATIVE for ear, mouth and throat problems, fever, hearing loss, hoarseness and sinus pressure  RESP: NEGATIVE for significant cough or SOB  CV: NEGATIVE for chest pain, palpitations or peripheral edema  NEURO: NEGATIVE for weakness, dizziness or paresthesias, involuntary movements, loss of consciousness and memory problems      Objective    /80 (BP Location: Left arm, Patient Position: Sitting, Cuff Size: Adult Regular)   Pulse 105   Ht 1.588 m (5' 2.5\")   Wt 73.2 kg (161 lb 6.4 oz)   LMP 10/31/2021   SpO2 97%   BMI 29.05 kg/m    Body mass index is 29.05 kg/m .  Physical Exam   GENERAL: healthy, alert and worried  EYES: Eyes grossly normal to inspection  HENT: normal cephalic/atraumatic, ear canals and TM's normal and oropharynx clear  NECK: no adenopathy, no asymmetry, masses, or scars and thyroid normal to palpation  RESP: lungs clear to auscultation - no rales, rhonchi or wheezes  CV: regular rates and rhythm and normal S1 S2, no S3 or S4  ABDOMEN: soft, nontender, without hepatosplenomegaly or masses  MS: no gross musculoskeletal defects noted, no edema  NEURO: Normal strength and tone, mentation intact and speech normal            "

## 2021-11-16 NOTE — TELEPHONE ENCOUNTER
"Patient calling reporting \"I am a celiac and have had wild blood sugar problems.\"  Patient reporting symptoms starting 1 week ago with intermittent episodes of feeling dizzy, sweaty, headaches, and nausea.  Patient stating symptoms resolve after eating (45 minutes after).  Symptoms occurring 2 times per day.  Stating she does not measure blood sugars and has not been diagnosed with diabetes.  Denies feeling dizzy or lightheaded during triage.  Stating she has been sweating today.  Afebrile.    Disposition to see provider with in 24 hours.  Transferred to Central Scheduling. Patient agrees to go to Walk In Clinic today if unable to schedule same day appointment.    Reviewed with patient if symptoms increase or change to call back or be seen.      Gina Ascencio RN  [facility]      COVID 19 Nurse Triage Plan/Patient Instructions    Please be aware that novel coronavirus (COVID-19) may be circulating in the community. If you develop symptoms such as fever, cough, or SOB or if you have concerns about the presence of another infection including coronavirus (COVID-19), please contact your health care provider or visit [email or web address].     Disposition/Instructions    In-Person Visit with provider recommended. Reference Visit Selection Guide.    Thank you for taking steps to prevent the spread of this virus.  o Limit your contact with others.  o Wear a simple mask to cover your cough.  o Wash your hands well and often.    Resources    [facility]: About COVID-19: [email or web address]    CDC: What to Do If You're Sick: www.cdc.gov/coronavirus/2019-ncov/about/steps-when-sick.html    CDC: Ending Home Isolation: www.cdc.gov/coronavirus/2019-ncov/hcp/disposition-in-home-patients.html     CDC: Caring for Someone: www.cdc.gov/coronavirus/2019-ncov/if-you-are-sick/care-for-someone.html     SARAH: Interim Guidance for Hospital Discharge to Home: " www.health.[facility].[place]/diseases/coronavirus/hcp/hospdischarge.pdf    [facility] clinical trials (COVID-19 research studies): [email or web address]     Below are the COVID-19 hotlines at the [place] Department of Health ([facility]). Interpreters are available.   o For health questions: Call 856-536-7306 or 1-213.109.4827 (7 a.m. to 7 p.m.)  o For questions about schools and childcare: Call 480-535-3197 or 1-943.344.3606 (7 a.m. to 7 p.m.)                       Reason for Disposition    [1] SEVERE sweating (e.g., drenched with sweat) AND [2] cause unknown    Additional Information    Negative: Shock suspected (e.g., cold/pale/clammy skin, too weak to stand, low BP, rapid pulse)    Negative: Sounds like a life-threatening emergency to the triager    Negative: Fever    Negative: Chest pain or cardiac ischemia is suspected    Negative: Weakness    Negative: Dizziness and lightheadedness (e.g., feeling woozy, feeling like he/she might faint)    Negative: Heat exhaustion suspected (i.e., dehydration from heat exposure)    Negative: [1] Has diabetes (diabetes mellitus) AND [2] sweating from low blood sugar (i.e., < 70 mg/dl or 3.9 mmol/l)    Negative: Difficulty breathing    Negative: Patient sounds very sick or weak to the triager    Protocols used: [id number]

## 2021-12-08 ENCOUNTER — MYC REFILL (OUTPATIENT)
Dept: FAMILY MEDICINE | Facility: CLINIC | Age: 26
End: 2021-12-08
Payer: COMMERCIAL

## 2021-12-08 DIAGNOSIS — L70.9 ACNE, UNSPECIFIED ACNE TYPE: ICD-10-CM

## 2021-12-10 RX ORDER — ADAPALENE GEL USP, 0.3% 3 MG/G
GEL TOPICAL
Qty: 45 G | Refills: 3 | Status: SHIPPED | OUTPATIENT
Start: 2021-12-10 | End: 2022-08-26

## 2021-12-10 NOTE — TELEPHONE ENCOUNTER
"Last Written Prescription Date:  8/26/20  Last Fill Quantity: 45 g,  # refills: 3   Last office visit provider:  11/16/21     Requested Prescriptions   Pending Prescriptions Disp Refills     adapalene (DIFFERIN) 0.3 % external gel 45 g 3       Topical Acne Medications Protocol Passed - 12/8/2021  4:06 PM        Passed - Patient is 12 years of age or older        Passed - Recent (12 mo) or future (30 days) visit within the authorizing provider's specialty     Patient has had an office visit with the authorizing provider or a provider within the authorizing providers department within the previous 12 mos or has a future within next 30 days. See \"Patient Info\" tab in inbasket, or \"Choose Columns\" in Meds & Orders section of the refill encounter.              Passed - Medication is active on med list             Bradford Kelly RN 12/10/21 8:42 AM  "

## 2022-06-04 DIAGNOSIS — F41.8 DEPRESSION WITH ANXIETY: ICD-10-CM

## 2022-06-08 RX ORDER — FLUOXETINE 40 MG/1
40 CAPSULE ORAL DAILY
Qty: 90 CAPSULE | Refills: 3 | Status: SHIPPED | OUTPATIENT
Start: 2022-06-08

## 2022-06-08 NOTE — TELEPHONE ENCOUNTER
"Routing refill request to provider for review/approval because:  phq 9    Last Written Prescription Date:  3/31/21  Last Fill Quantity: 90,  # refills: 3   Last office visit provider:  11/16/21     Requested Prescriptions   Pending Prescriptions Disp Refills     FLUoxetine (PROZAC) 40 MG capsule 90 capsule 3     Sig: Take 1 capsule (40 mg) by mouth daily       SSRIs Protocol Failed - 6/7/2022  9:24 AM        Failed - PHQ-9 score less than 5 in past 6 months     Please review last PHQ-9 score.           Failed - Recent (6 mo) or future (30 days) visit within the authorizing provider's specialty     Patient had office visit in the last 6 months or has a visit in the next 30 days with authorizing provider or within the authorizing provider's specialty.  See \"Patient Info\" tab in inbasket, or \"Choose Columns\" in Meds & Orders section of the refill encounter.            Passed - Medication is active on med list        Passed - Patient is age 18 or older        Passed - No active pregnancy on record        Passed - No positive pregnancy test in last 12 months             Usha Turner RN 06/07/22 10:06 PM  "

## 2022-07-10 ENCOUNTER — HEALTH MAINTENANCE LETTER (OUTPATIENT)
Age: 27
End: 2022-07-10

## 2022-08-18 ENCOUNTER — NURSE TRIAGE (OUTPATIENT)
Dept: NURSING | Facility: CLINIC | Age: 27
End: 2022-08-18

## 2022-08-18 NOTE — TELEPHONE ENCOUNTER
Hx of anemia in past.  Now having dizzy spells for about a month or two.  Pale.  Large bruising for minor bumps.  Per the protocol, I recommended ER.  Patient stated she will not go to ER.  I advised she be seen today even if it's in [facility]/.  She wasn't sure what she would do.      Reason for Disposition    Dizziness or lightheadedness    Additional Information    Negative: Shock suspected (e.g., cold/pale/clammy skin, too weak to stand, low BP, rapid pulse)    Negative: Fever and purple or blood-colored spots or dots    Negative: Sounds like a life-threatening emergency to the triager    Protocols used: KRISTINE-A-OH    Tatyana MEDINA RN [facility]

## 2022-08-18 NOTE — TELEPHONE ENCOUNTER
Called and spoke with patient who states that her symptoms are stable. Assisted in scheduling with PCP 8/26 at 1:30 (1:10 arrival time) to discuss concerns. Advised patient that if symptoms start to worsen before her appointment then she would need to be evaluated in an UC/ED setting. Patient stated understanding and is in agreement with plan.    Myra Tejada RN

## 2022-08-18 NOTE — TELEPHONE ENCOUNTER
I reviewed patient's MyChart message and previous dizzy spells.  Her anemia has been normal in the past.      She can be scheduled in the clinic in next couple of weeks.  If her symptoms are not stable and worsening then I agree that she needs to be evaluated right away in urgent care.  Specially if she is not comfortable waiting for an appointment.    Okay to use a same-day spot in the next couple of weeks.    Dimitri Rodriguez MD

## 2022-08-25 ASSESSMENT — ASTHMA QUESTIONNAIRES
QUESTION_5 LAST FOUR WEEKS HOW WOULD YOU RATE YOUR ASTHMA CONTROL: COMPLETELY CONTROLLED
QUESTION_2 LAST FOUR WEEKS HOW OFTEN HAVE YOU HAD SHORTNESS OF BREATH: NOT AT ALL
ACT_TOTALSCORE: 25
QUESTION_3 LAST FOUR WEEKS HOW OFTEN DID YOUR ASTHMA SYMPTOMS (WHEEZING, COUGHING, SHORTNESS OF BREATH, CHEST TIGHTNESS OR PAIN) WAKE YOU UP AT NIGHT OR EARLIER THAN USUAL IN THE MORNING: NOT AT ALL
QUESTION_1 LAST FOUR WEEKS HOW MUCH OF THE TIME DID YOUR ASTHMA KEEP YOU FROM GETTING AS MUCH DONE AT WORK, SCHOOL OR AT HOME: NONE OF THE TIME
QUESTION_4 LAST FOUR WEEKS HOW OFTEN HAVE YOU USED YOUR RESCUE INHALER OR NEBULIZER MEDICATION (SUCH AS ALBUTEROL): NOT AT ALL
ACT_TOTALSCORE: 25

## 2022-08-26 ENCOUNTER — OFFICE VISIT (OUTPATIENT)
Dept: FAMILY MEDICINE | Facility: CLINIC | Age: 27
End: 2022-08-26
Payer: COMMERCIAL

## 2022-08-26 VITALS
HEART RATE: 83 BPM | DIASTOLIC BLOOD PRESSURE: 75 MMHG | OXYGEN SATURATION: 98 % | BODY MASS INDEX: 30.9 KG/M2 | SYSTOLIC BLOOD PRESSURE: 121 MMHG | WEIGHT: 174.4 LBS | HEIGHT: 63 IN | RESPIRATION RATE: 16 BRPM

## 2022-08-26 DIAGNOSIS — Z11.4 SCREENING FOR HIV (HUMAN IMMUNODEFICIENCY VIRUS): ICD-10-CM

## 2022-08-26 DIAGNOSIS — F33.42 RECURRENT MAJOR DEPRESSIVE DISORDER, IN FULL REMISSION (H): ICD-10-CM

## 2022-08-26 DIAGNOSIS — Z11.59 NEED FOR HEPATITIS C SCREENING TEST: ICD-10-CM

## 2022-08-26 DIAGNOSIS — R42 DIZZINESS: Primary | ICD-10-CM

## 2022-08-26 DIAGNOSIS — T14.8XXA BRUISING: ICD-10-CM

## 2022-08-26 PROBLEM — F51.01 PRIMARY INSOMNIA: Status: ACTIVE | Noted: 2018-12-19

## 2022-08-26 PROBLEM — M79.609 PAIN IN LIMB: Status: ACTIVE | Noted: 2022-08-26

## 2022-08-26 PROBLEM — K59.00 CONSTIPATION: Status: ACTIVE | Noted: 2022-08-26

## 2022-08-26 PROBLEM — J45.20 MILD INTERMITTENT ASTHMA WITHOUT COMPLICATION: Status: ACTIVE | Noted: 2018-12-19

## 2022-08-26 PROBLEM — L70.0 ACNE VULGARIS: Status: ACTIVE | Noted: 2018-12-19

## 2022-08-26 PROBLEM — J98.2 PNEUMOMEDIASTINUM (H): Status: RESOLVED | Noted: 2018-09-10 | Resolved: 2022-08-26

## 2022-08-26 PROBLEM — K90.0 CELIAC DISEASE: Status: ACTIVE | Noted: 2018-09-11

## 2022-08-26 PROBLEM — J45.909 ASTHMA: Status: ACTIVE | Noted: 2022-08-26

## 2022-08-26 PROBLEM — L03.90 CELLULITIS: Status: ACTIVE | Noted: 2022-08-26

## 2022-08-26 PROBLEM — Z91.09 OTHER ALLERGY, OTHER THAN TO MEDICINAL AGENTS: Status: ACTIVE | Noted: 2022-08-26

## 2022-08-26 PROBLEM — J98.2 PNEUMOMEDIASTINUM (H): Status: ACTIVE | Noted: 2018-09-10

## 2022-08-26 PROBLEM — J32.9 CHRONIC SINUSITIS: Status: ACTIVE | Noted: 2022-08-26

## 2022-08-26 LAB
ALBUMIN SERPL BCG-MCNC: 4.3 G/DL (ref 3.5–5.2)
ALP SERPL-CCNC: 54 U/L (ref 35–104)
ALT SERPL W P-5'-P-CCNC: 15 U/L (ref 10–35)
ANION GAP SERPL CALCULATED.3IONS-SCNC: 12 MMOL/L (ref 7–15)
AST SERPL W P-5'-P-CCNC: 19 U/L (ref 10–35)
BASOPHILS # BLD AUTO: 0 10E3/UL (ref 0–0.2)
BASOPHILS NFR BLD AUTO: 0 %
BILIRUB SERPL-MCNC: 0.2 MG/DL
BUN SERPL-MCNC: 8.7 MG/DL (ref 6–20)
CALCIUM SERPL-MCNC: 9.2 MG/DL (ref 8.6–10)
CHLORIDE SERPL-SCNC: 105 MMOL/L (ref 98–107)
CREAT SERPL-MCNC: 0.75 MG/DL (ref 0.51–0.95)
DEPRECATED HCO3 PLAS-SCNC: 22 MMOL/L (ref 22–29)
EOSINOPHIL # BLD AUTO: 0.1 10E3/UL (ref 0–0.7)
EOSINOPHIL NFR BLD AUTO: 1 %
ERYTHROCYTE [DISTWIDTH] IN BLOOD BY AUTOMATED COUNT: 12.4 % (ref 10–15)
FERRITIN SERPL-MCNC: 61 NG/ML (ref 6–175)
GFR SERPL CREATININE-BSD FRML MDRD: >90 ML/MIN/1.73M2
GLUCOSE SERPL-MCNC: 85 MG/DL (ref 70–99)
HCT VFR BLD AUTO: 34.4 % (ref 35–47)
HGB BLD-MCNC: 11.8 G/DL (ref 11.7–15.7)
IMM GRANULOCYTES # BLD: 0 10E3/UL
IMM GRANULOCYTES NFR BLD: 0 %
IRON BINDING CAPACITY (ROCHE): 344 UG/DL (ref 240–430)
IRON SATN MFR SERPL: 22 % (ref 15–46)
IRON SERPL-MCNC: 76 UG/DL (ref 37–145)
LYMPHOCYTES # BLD AUTO: 2.7 10E3/UL (ref 0.8–5.3)
LYMPHOCYTES NFR BLD AUTO: 43 %
MCH RBC QN AUTO: 28.1 PG (ref 26.5–33)
MCHC RBC AUTO-ENTMCNC: 34.3 G/DL (ref 31.5–36.5)
MCV RBC AUTO: 82 FL (ref 78–100)
MONOCYTES # BLD AUTO: 0.4 10E3/UL (ref 0–1.3)
MONOCYTES NFR BLD AUTO: 7 %
NEUTROPHILS # BLD AUTO: 3 10E3/UL (ref 1.6–8.3)
NEUTROPHILS NFR BLD AUTO: 48 %
PLATELET # BLD AUTO: 289 10E3/UL (ref 150–450)
POTASSIUM SERPL-SCNC: 4.1 MMOL/L (ref 3.4–5.3)
PROT SERPL-MCNC: 6.3 G/DL (ref 6.4–8.3)
RBC # BLD AUTO: 4.2 10E6/UL (ref 3.8–5.2)
SODIUM SERPL-SCNC: 139 MMOL/L (ref 136–145)
WBC # BLD AUTO: 6.2 10E3/UL (ref 4–11)

## 2022-08-26 PROCEDURE — 87389 HIV-1 AG W/HIV-1&-2 AB AG IA: CPT | Performed by: FAMILY MEDICINE

## 2022-08-26 PROCEDURE — 86803 HEPATITIS C AB TEST: CPT | Performed by: FAMILY MEDICINE

## 2022-08-26 PROCEDURE — 80053 COMPREHEN METABOLIC PANEL: CPT | Performed by: FAMILY MEDICINE

## 2022-08-26 PROCEDURE — 82728 ASSAY OF FERRITIN: CPT | Performed by: FAMILY MEDICINE

## 2022-08-26 PROCEDURE — 99214 OFFICE O/P EST MOD 30 MIN: CPT | Performed by: FAMILY MEDICINE

## 2022-08-26 PROCEDURE — 36415 COLL VENOUS BLD VENIPUNCTURE: CPT | Performed by: FAMILY MEDICINE

## 2022-08-26 PROCEDURE — 83550 IRON BINDING TEST: CPT | Performed by: FAMILY MEDICINE

## 2022-08-26 PROCEDURE — 85025 COMPLETE CBC W/AUTO DIFF WBC: CPT | Performed by: FAMILY MEDICINE

## 2022-08-26 PROCEDURE — 83540 ASSAY OF IRON: CPT | Performed by: FAMILY MEDICINE

## 2022-08-26 RX ORDER — MECLIZINE HYDROCHLORIDE 25 MG/1
25 TABLET ORAL DAILY PRN
Qty: 20 TABLET | Refills: 0 | Status: SHIPPED | OUTPATIENT
Start: 2022-08-26

## 2022-08-26 NOTE — LETTER
My Depression Action Plan  Name: Genevieve Ryan   Date of Birth 1995  Date: 8/26/2022    My doctor: Dimitri Rodriguez   My clinic: [address] [facility] [id number] [phone]          GREEN    ZONE   Good Control    What it looks like:     Things are going generally well. You have normal ups and downs. You may even feel depressed from time to time, but bad moods usually last less than a day.   What you need to do:  1. Continue to care for yourself (see self care plan)  2. Check your depression survival kit and update it as needed  3. Follow your physician s recommendations including any medication.  4. Do not stop taking medication unless you consult with your physician first.           YELLOW         ZONE Getting Worse    What it looks like:     Depression is starting to interfere with your life.     It may be hard to get out of bed; you may be starting to isolate yourself from others.    Symptoms of depression are starting to last most all day and this has happened for several days.     You may have suicidal thoughts but they are not constant.   What you need to do:     1. Call your care team. Your response to treatment will improve if you keep your care team informed of your progress. Yellow periods are signs an adjustment may need to be made.     2. Continue your self-care.  Just get dressed and ready for the day.  Don't give yourself time to talk yourself out of it.    3. Talk to someone in your support network.    4. Open up your Depression Self-Care Plan/Wellness Kit.           RED    ZONE Medical Alert - Get Help    What it looks like:     Depression is seriously interfering with your life.     You may experience these or other symptoms: You can t get out of bed most days, can t work or engage in other necessary activities, you have trouble taking care of basic hygiene, or basic responsibilities, thoughts of suicide or death  that will not go away, self-injurious behavior.     What you need to do:  1. Call your care team and request a same-day appointment. If they are not available (weekends or after hours) call your local crisis line, emergency room or 911.          Depression Self-Care Plan / Wellness Kit    Many people find that medication and therapy are helpful treatments for managing depression. In addition, making small changes to your everyday life can help to boost your mood and improve your wellbeing. Below are some tips for you to consider. Be sure to talk with your medical provider and/or behavioral health consultant if your symptoms are worsening or not improving.     Sleep   Sleep hygiene  means all of the habits that support good, restful sleep. It includes maintaining a consistent bedtime and wake time, using your bedroom only for sleeping or sex, and keeping the bedroom dark and free of distractions like a computer, smartphone, or television.     Develop a Healthy Routine  Maintain good hygiene. Get out of bed in the morning, make your bed, brush your teeth, take a shower, and get dressed. Don t spend too much time viewing media that makes you feel stressed. Find time to relax each day.    Exercise  Get some form of exercise every day. This will help reduce pain and release endorphins, the  feel good  chemicals in your brain. It can be as simple as just going for a walk or doing some gardening, anything that will get you moving.      Diet  Strive to eat healthy foods, including fruits and vegetables. Drink plenty of water. Avoid excessive sugar, caffeine, alcohol, and other mood-altering substances.     Stay Connected with Others  Stay in touch with friends and family members.    Manage Your Mood  Try deep breathing, massage therapy, biofeedback, or meditation. Take part in fun activities when you can. Try to find something to smile about each day.     Psychotherapy  Be open to working with a therapist if your provider  recommends it.     Medication  Be sure to take your medication as prescribed. Most anti-depressants need to be taken every day. It usually takes several weeks for medications to work. Not all medicines work for all people. It is important to follow-up with your provider to make sure you have a treatment plan that is working for you. Do not stop your medication abruptly without first discussing it with your provider.    Crisis Resources   These hotlines are for both adults and children. They and are open 24 hours a day, 7 days a week unless noted otherwise.      National Suicide Prevention Lifeline   988 or 6-917-973-YFQR (9369)      Crisis Text Line    www.crisistextline.org  Text HOME to 024655 from anywhere in the [place], anytime, about any type of crisis. A live, trained crisis counselor will receive the text and respond quickly.      [place] Lifeline for LGBTQ Youth  A national crisis intervention and suicide lifeline for LGBTQ youth under 25. Provides a safe place to talk without judgement. Call 1-364.385.4441; text START to 808044 or visit www.thetrevorproject.org to talk to a trained counselor.      For [facility] crisis numbers, visit the [facility] website at:  https://[place].gov/dhs/people-we-serve/adults/health-care/mental-health/resources/crisis-contacts.jsp

## 2022-08-26 NOTE — LETTER
My Asthma Action Plan    Name: Genevieve Ryan   YOB: 1995  Date: 8/26/2022   My doctor: Dimitri Rodriguez MD   My clinic: [facility]        My Rescue Medicine:   Albuterol inhaler (Proair/Ventolin/Proventil HFA)  2-4 puffs EVERY 4 HOURS as needed. Use a spacer if recommended by your provider.   My Asthma Severity:   Intermittent / Exercise Induced  Know your asthma triggers: Possible gluten             GREEN ZONE   Good Control    I feel good    No cough or wheeze    Can work, sleep and play without asthma symptoms       Take your asthma control medicine every day.     1. If exercise triggers your asthma, take your rescue medication    15 minutes before exercise or sports, and    During exercise if you have asthma symptoms  2. Spacer to use with inhaler: If you have a spacer, make sure to use it with your inhaler             YELLOW ZONE Getting Worse  I have ANY of these:    I do not feel good    Cough or wheeze    Chest feels tight    Wake up at night   1. Keep taking your Green Zone medications  2. Start taking your rescue medicine:    every 20 minutes for up to 1 hour. Then every 4 hours for 24-48 hours.  3. If you stay in the Yellow Zone for more than 12-24 hours, contact your doctor.  4. If you do not return to the Green Zone in 12-24 hours or you get worse, start taking your oral steroid medicine if prescribed by your provider.           RED ZONE Medical Alert - Get Help  I have ANY of these:    I feel awful    Medicine is not helping    Breathing getting harder    Trouble walking or talking    Nose opens wide to breathe       1. Take your rescue medicine NOW  2. If your provider has prescribed an oral steroid medicine, start taking it NOW  3. Call your doctor NOW  4. If you are still in the Red Zone after 20 minutes and you have not reached your doctor:    Take your rescue medicine again and    Call 911 or go to the emergency room right away    See your  regular doctor within 2 weeks of an Emergency Room or Urgent Care visit for follow-up treatment.          Annual Reminders:  Meet with Asthma Educator,  Flu Shot in the Fall, consider Pneumonia Vaccination for patients with asthma (aged 19 and older).    Pharmacy:    [id number] - [place], [place] - [address] AT [facility] [address]  [id number] - [place], [place] - [address] [place] [address] & [facility]  [id number] - [place], [place] - [address] AT [facility] [address]    Electronically signed by Diimtri Rodriguez MD   Date: 08/26/22                    Asthma Triggers  How To Control Things That Make Your Asthma Worse    Triggers are things that make your asthma worse.  Look at the list below to help you find your triggers and   what you can do about them. You can help prevent asthma flare-ups by staying away from your triggers.      Trigger                                                          What you can do   Cigarette Smoke  Tobacco smoke can make asthma worse. Do not allow smoking in your home, car or around you.  Be sure no one smokes at a child s day care or school.  If you smoke, ask your health care provider for ways to help you quit.  Ask family members to quit too.  Ask your health care provider for a referral to Quit Plan to help you quit smoking, or call 2-967-922-PLAN.     Colds, Flu, Bronchitis  These are common triggers of asthma. Wash your hands often.  Don t touch your eyes, nose or mouth.  Get a flu shot every year.     Dust Mites  These are tiny bugs that live in cloth or carpet. They are too small to see. Wash sheets and blankets in hot water every week.   Encase pillows and mattress in dust mite proof covers.  Avoid having carpet if you can. If you have carpet, vacuum weekly.   Use a dust mask and HEPA vacuum.   Pollen and Outdoor Mold  Some people are allergic to trees, grass, or weed pollen, or molds. Try to  keep your windows closed.  Limit time out doors when pollen count is high.   Ask you health care provider about taking medicine during allergy season.     Animal Dander  Some people are allergic to skin flakes, urine or saliva from pets with fur or feathers. Keep pets with fur or feathers out of your home.    If you can t keep the pet outdoors, then keep the pet out of your bedroom.  Keep the bedroom door closed.  Keep pets off cloth furniture and away from stuffed toys.     Mice, Rats, and Cockroaches  Some people are allergic to the waste from these pests.   Cover food and garbage.  Clean up spills and food crumbs.  Store grease in the refrigerator.   Keep food out of the bedroom.   Indoor Mold  This can be a trigger if your home has high moisture. Fix leaking faucets, pipes, or other sources of water.   Clean moldy surfaces.  Dehumidify basement if it is damp and smelly.   Smoke, Strong Odors, and Sprays  These can reduce air quality. Stay away from strong odors and sprays, such as perfume, powder, hair spray, paints, smoke incense, paint, cleaning products, candles and new carpet.   Exercise or Sports  Some people with asthma have this trigger. Be active!  Ask your doctor about taking medicine before sports or exercise to prevent symptoms.    Warm up for 5-10 minutes before and after sports or exercise.     Other Triggers of Asthma  Cold air:  Cover your nose and mouth with a scarf.  Sometimes laughing or crying can be a trigger.  Some medicines and food can trigger asthma.

## 2022-08-26 NOTE — PROGRESS NOTES
"  Assessment & Plan     ICD-10-CM    1. Dizziness  R42 CBC with platelets and differential     meclizine (ANTIVERT) 25 MG tablet     CBC with platelets and differential     Ferritin     Iron and iron binding capacity     Ferritin     Iron and iron binding capacity   2. Screening for HIV (human immunodeficiency virus)  Z11.4 HIV Antigen Antibody Combo     HIV Antigen Antibody Combo   3. Need for hepatitis C screening test  Z11.59 Hepatitis C Screen Reflex to HCV RNA Quant and Genotype     Hepatitis C Screen Reflex to HCV RNA Quant and Genotype   4. Recurrent major depressive disorder, in full remission (H)  F33.42    5. Bruising  T14.8XXA CBC with platelets and differential     CBC with platelets and differential     Ferritin     Comprehensive metabolic panel (BMP + Alb, Alk Phos, ALT, AST, Total. Bili, TP)     Comprehensive metabolic panel (BMP + Alb, Alk Phos, ALT, AST, Total. Bili, TP)     Ferritin     Medical decision making: Patient with dizziness and bruising presents today.  She has a history of iron deficiency anemia.  She does have moderate menstrual cycles but not really heavy.  She is on birth control pills.  Labs checked today shows normal hemoglobin but at the lower end.  We will check for ferritin and serum iron levels.  Patient does have major depression in full remission.  She follows with psychiatrist at [place].  She is tolerating her medication well.       BMI:   Estimated body mass index is 31.39 kg/m  as calculated from the following:    Height as of this encounter: 1.588 m (5' 2.5\").    Weight as of this encounter: 79.1 kg (174 lb 6.4 oz).   Weight management plan: Discussed healthy diet and exercise guidelines    MEDICATIONS:  Continue current medications without change    Return in about 1 year (around 8/26/2023) for Routine preventive.    Dimitri Rodriguez MD  [facility]    Azael Vallejo is a 27 year old, presenting for the following health " issues:  Dizziness (/X 2-3 month/Patient is unable to drive some days due to dizziness. /Room feels like its moving around her. ) and Bleeding/Bruising (Patient has been getting bruising for 3 weeks. The bruises appear out of no where.  They are all over her arms and leg. Patient has been iron defiant in the past. )      History of Present Illness       Reason for visit:  Severe dizzy spells, bruising -- feels like anemia I had before  Symptom onset:  More than a month  Symptom intensity:  Moderate  Symptom progression:  Worsening  Had these symptoms before:  Yes  Has tried/received treatment for these symptoms:  Yes  Previous treatment was successful:  Yes  Prior treatment description:  Iron supplements    She eats 2-3 servings of fruits and vegetables daily.She consumes 0 sweetened beverage(s) daily.She exercises with enough effort to increase her heart rate 20 to 29 minutes per day.  She exercises with enough effort to increase her heart rate 4 days per week.   She is taking medications regularly.     Patient Active Problem List   Diagnosis     Dysphonia     Acne vulgaris     Asthma     Celiac disease     Cellulitis     Chronic sinusitis     Constipation     Dizziness     Mild intermittent asthma without complication     Other allergy, other than to medicinal agents     Pain in limb     Primary insomnia     Recurrent major depressive disorder, in full remission (H)     Current Outpatient Medications   Medication     albuterol (PROAIR HFA;PROVENTIL HFA;VENTOLIN HFA) 90 mcg/actuation inhaler     drospirenone-ethinyl estradiol (VESTURA, 28,) 3-0.02 mg per tablet     FLUoxetine (PROZAC) 40 MG capsule     gabapentin (NEURONTIN) 100 MG capsule     meclizine (ANTIVERT) 25 MG tablet     melatonin 5 mg Tab     traZODone (DESYREL) 50 MG tablet     No current facility-administered medications for this visit.         Review of Systems   Constitutional, HEENT, cardiovascular, pulmonary, gi and gu systems are negative, except  "as otherwise noted.      Objective    /75 (BP Location: Left arm, Patient Position: Sitting, Cuff Size: Adult Regular)   Pulse 83   Resp 16   Ht 1.588 m (5' 2.5\")   Wt 79.1 kg (174 lb 6.4 oz)   SpO2 98%   BMI 31.39 kg/m    Body mass index is 31.39 kg/m .  Physical Exam   GENERAL: healthy, alert and no distress  NECK: no adenopathy, no asymmetry, masses, or scars and thyroid normal to palpation  RESP: lungs clear to auscultation - no rales, rhonchi or wheezes  CV: regular rate and rhythm, normal S1 S2, no S3 or S4, no murmur, click or rub, no peripheral edema and peripheral pulses strong  ABDOMEN: soft, nontender, no hepatosplenomegaly, no masses and bowel sounds normal  MS: no gross musculoskeletal defects noted, no edema  SKIN: no suspicious lesions or rashes and noted bruising on upper thighs and upper arms.    Results for orders placed or performed in visit on 08/26/22 (from the past 24 hour(s))   CBC with platelets and differential    Narrative    The following orders were created for panel order CBC with platelets and differential.  Procedure                               Abnormality         Status                     ---------                               -----------         ------                     CBC with platelets and d...[[id number]]  Abnormal            Final result                 Please view results for these tests on the individual orders.   CBC with platelets and differential   Result Value Ref Range    WBC Count 6.2 4.0 - 11.0 10e3/uL    RBC Count 4.20 3.80 - 5.20 10e6/uL    Hemoglobin 11.8 11.7 - 15.7 g/dL    Hematocrit 34.4 (L) 35.0 - 47.0 %    MCV 82 78 - 100 fL    MCH 28.1 26.5 - 33.0 pg    MCHC 34.3 31.5 - 36.5 g/dL    RDW 12.4 10.0 - 15.0 %    Platelet Count 289 150 - 450 10e3/uL    % Neutrophils 48 %    % Lymphocytes 43 %    % Monocytes 7 %    % Eosinophils 1 %    % Basophils 0 %    % Immature Granulocytes 0 %    Absolute Neutrophils 3.0 1.6 - 8.3 10e3/uL    Absolute Lymphocytes " 2.7 0.8 - 5.3 10e3/uL    Absolute Monocytes 0.4 0.0 - 1.3 10e3/uL    Absolute Eosinophils 0.1 0.0 - 0.7 10e3/uL    Absolute Basophils 0.0 0.0 - 0.2 10e3/uL    Absolute Immature Granulocytes 0.0 <=0.4 10e3/uL                   .  ..

## 2022-08-29 LAB
HCV AB SERPL QL IA: NONREACTIVE
HIV 1+2 AB+HIV1 P24 AG SERPL QL IA: NONREACTIVE

## 2022-09-10 ENCOUNTER — HEALTH MAINTENANCE LETTER (OUTPATIENT)
Age: 27
End: 2022-09-10

## 2022-11-01 ENCOUNTER — LAB REQUISITION (OUTPATIENT)
Dept: LAB | Facility: CLINIC | Age: 27
End: 2022-11-01

## 2022-11-01 DIAGNOSIS — Z11.3 ENCOUNTER FOR SCREENING FOR INFECTIONS WITH A PREDOMINANTLY SEXUAL MODE OF TRANSMISSION: ICD-10-CM

## 2022-11-01 PROCEDURE — 87491 CHLMYD TRACH DNA AMP PROBE: CPT | Performed by: NURSE PRACTITIONER

## 2022-11-02 LAB
C TRACH DNA SPEC QL PROBE+SIG AMP: NEGATIVE
N GONORRHOEA DNA SPEC QL NAA+PROBE: NEGATIVE

## 2023-01-21 ENCOUNTER — HEALTH MAINTENANCE LETTER (OUTPATIENT)
Age: 28
End: 2023-01-21

## 2023-01-25 ENCOUNTER — LAB (OUTPATIENT)
Dept: LAB | Facility: CLINIC | Age: 28
End: 2023-01-25
Payer: COMMERCIAL

## 2023-01-25 DIAGNOSIS — M25.562 BILATERAL KNEE PAIN: Primary | ICD-10-CM

## 2023-01-25 DIAGNOSIS — M25.561 BILATERAL KNEE PAIN: Primary | ICD-10-CM

## 2023-01-25 LAB
BASOPHILS # BLD AUTO: 0 10E3/UL (ref 0–0.2)
BASOPHILS NFR BLD AUTO: 0 %
CRP SERPL-MCNC: 17.9 MG/L
EOSINOPHIL # BLD AUTO: 0.1 10E3/UL (ref 0–0.7)
EOSINOPHIL NFR BLD AUTO: 1 %
ERYTHROCYTE [SEDIMENTATION RATE] IN BLOOD BY WESTERGREN METHOD: 11 MM/HR (ref 0–20)
LYMPHOCYTES # BLD AUTO: 2.5 10E3/UL (ref 0.8–5.3)
LYMPHOCYTES NFR BLD AUTO: 36 %
MONOCYTES # BLD AUTO: 0.3 10E3/UL (ref 0–1.3)
MONOCYTES NFR BLD AUTO: 4 %
NEUTROPHILS # BLD AUTO: 4 10E3/UL (ref 1.6–8.3)
NEUTROPHILS NFR BLD AUTO: 59 %
T3FREE SERPL-MCNC: 3.2 PG/ML (ref 2–4.4)
T4 FREE SERPL-MCNC: 0.91 NG/DL (ref 0.9–1.7)
TSH SERPL DL<=0.005 MIU/L-ACNC: 2.82 UIU/ML (ref 0.3–4.2)
URATE SERPL-MCNC: 4.1 MG/DL (ref 2.4–5.7)
WBC # BLD AUTO: 6.9 10E3/UL (ref 4–11)

## 2023-01-25 PROCEDURE — 86140 C-REACTIVE PROTEIN: CPT

## 2023-01-25 PROCEDURE — 85048 AUTOMATED LEUKOCYTE COUNT: CPT

## 2023-01-25 PROCEDURE — 86618 LYME DISEASE ANTIBODY: CPT

## 2023-01-25 PROCEDURE — 84550 ASSAY OF BLOOD/URIC ACID: CPT

## 2023-01-25 PROCEDURE — 86431 RHEUMATOID FACTOR QUANT: CPT

## 2023-01-25 PROCEDURE — 36415 COLL VENOUS BLD VENIPUNCTURE: CPT

## 2023-01-25 PROCEDURE — 84443 ASSAY THYROID STIM HORMONE: CPT

## 2023-01-25 PROCEDURE — 84481 FREE ASSAY (FT-3): CPT

## 2023-01-25 PROCEDURE — 86038 ANTINUCLEAR ANTIBODIES: CPT

## 2023-01-25 PROCEDURE — 85004 AUTOMATED DIFF WBC COUNT: CPT

## 2023-01-25 PROCEDURE — 85652 RBC SED RATE AUTOMATED: CPT

## 2023-01-25 PROCEDURE — 84439 ASSAY OF FREE THYROXINE: CPT

## 2023-01-26 LAB
ANA SER QL IF: NEGATIVE
B BURGDOR IGG+IGM SER QL: 0.05
RHEUMATOID FACT SER NEPH-ACNC: <7 IU/ML

## 2024-02-18 ENCOUNTER — HEALTH MAINTENANCE LETTER (OUTPATIENT)
Age: 29
End: 2024-02-18

## 2024-04-04 ENCOUNTER — LAB REQUISITION (OUTPATIENT)
Dept: LAB | Facility: CLINIC | Age: 29
End: 2024-04-04

## 2024-04-04 DIAGNOSIS — Z11.3 ENCOUNTER FOR SCREENING FOR INFECTIONS WITH A PREDOMINANTLY SEXUAL MODE OF TRANSMISSION: ICD-10-CM

## 2024-04-04 DIAGNOSIS — Z12.4 ENCOUNTER FOR SCREENING FOR MALIGNANT NEOPLASM OF CERVIX: ICD-10-CM

## 2024-04-04 PROCEDURE — 87491 CHLMYD TRACH DNA AMP PROBE: CPT | Performed by: NURSE PRACTITIONER

## 2024-04-04 PROCEDURE — G0145 SCR C/V CYTO,THINLAYER,RESCR: HCPCS | Performed by: NURSE PRACTITIONER

## 2024-04-05 LAB
C TRACH DNA SPEC QL PROBE+SIG AMP: NEGATIVE
N GONORRHOEA DNA SPEC QL NAA+PROBE: NEGATIVE

## 2024-04-08 LAB
BKR LAB AP GYN ADEQUACY: NORMAL
BKR LAB AP GYN INTERPRETATION: NORMAL
BKR LAB AP HPV REFLEX: NORMAL
BKR LAB AP LMP: NORMAL
BKR LAB AP PREVIOUS ABNL DX: NORMAL
BKR LAB AP PREVIOUS ABNORMAL: NORMAL
PATH REPORT.COMMENTS IMP SPEC: NORMAL
PATH REPORT.COMMENTS IMP SPEC: NORMAL
PATH REPORT.RELEVANT HX SPEC: NORMAL

## 2025-03-09 ENCOUNTER — HEALTH MAINTENANCE LETTER (OUTPATIENT)
Age: 30
End: 2025-03-09